# Patient Record
Sex: MALE | Race: WHITE | Employment: OTHER | URBAN - METROPOLITAN AREA
[De-identification: names, ages, dates, MRNs, and addresses within clinical notes are randomized per-mention and may not be internally consistent; named-entity substitution may affect disease eponyms.]

---

## 2024-01-03 ENCOUNTER — APPOINTMENT (OUTPATIENT)
Dept: CT IMAGING | Facility: HOSPITAL | Age: 89
End: 2024-01-03
Attending: EMERGENCY MEDICINE
Payer: MEDICARE

## 2024-01-03 ENCOUNTER — HOSPITAL ENCOUNTER (INPATIENT)
Facility: HOSPITAL | Age: 89
LOS: 3 days | Discharge: NURSING, REHAB OR LONG-TERM CARE | End: 2024-01-07
Attending: EMERGENCY MEDICINE | Admitting: HOSPITALIST
Payer: MEDICARE

## 2024-01-03 ENCOUNTER — APPOINTMENT (OUTPATIENT)
Dept: GENERAL RADIOLOGY | Facility: HOSPITAL | Age: 89
End: 2024-01-03
Attending: EMERGENCY MEDICINE
Payer: MEDICARE

## 2024-01-03 DIAGNOSIS — R79.89 ELEVATED TROPONIN: ICD-10-CM

## 2024-01-03 DIAGNOSIS — R07.9 CHEST PAIN OF UNCERTAIN ETIOLOGY: ICD-10-CM

## 2024-01-03 LAB
ALBUMIN SERPL-MCNC: 3.4 G/DL (ref 3.4–5)
ALBUMIN/GLOB SERPL: 0.8 {RATIO} (ref 1–2)
ALP LIVER SERPL-CCNC: 101 U/L
ALT SERPL-CCNC: 18 U/L
ANION GAP SERPL CALC-SCNC: 6 MMOL/L (ref 0–18)
AST SERPL-CCNC: 19 U/L (ref 15–37)
BASOPHILS # BLD AUTO: 0.03 X10(3) UL (ref 0–0.2)
BASOPHILS NFR BLD AUTO: 0.2 %
BILIRUB SERPL-MCNC: 0.4 MG/DL (ref 0.1–2)
BUN BLD-MCNC: 25 MG/DL (ref 9–23)
CALCIUM BLD-MCNC: 8.7 MG/DL (ref 8.5–10.1)
CHLORIDE SERPL-SCNC: 106 MMOL/L (ref 98–112)
CHOLEST SERPL-MCNC: 150 MG/DL (ref ?–200)
CO2 SERPL-SCNC: 29 MMOL/L (ref 21–32)
CREAT BLD-MCNC: 1.69 MG/DL
D DIMER PPP FEU-MCNC: 1.94 UG/ML FEU (ref ?–0.86)
EGFRCR SERPLBLD CKD-EPI 2021: 39 ML/MIN/1.73M2 (ref 60–?)
EOSINOPHIL # BLD AUTO: 0.47 X10(3) UL (ref 0–0.7)
EOSINOPHIL NFR BLD AUTO: 3.1 %
ERYTHROCYTE [DISTWIDTH] IN BLOOD BY AUTOMATED COUNT: 14.8 %
GLOBULIN PLAS-MCNC: 4.4 G/DL (ref 2.8–4.4)
GLUCOSE BLD-MCNC: 121 MG/DL (ref 70–99)
HCT VFR BLD AUTO: 48 %
HDLC SERPL-MCNC: 55 MG/DL (ref 40–59)
HGB BLD-MCNC: 15.2 G/DL
IMM GRANULOCYTES # BLD AUTO: 0.1 X10(3) UL (ref 0–1)
IMM GRANULOCYTES NFR BLD: 0.7 %
LDLC SERPL CALC-MCNC: 77 MG/DL (ref ?–100)
LYMPHOCYTES # BLD AUTO: 0.8 X10(3) UL (ref 1–4)
LYMPHOCYTES NFR BLD AUTO: 5.3 %
MCH RBC QN AUTO: 31.6 PG (ref 26–34)
MCHC RBC AUTO-ENTMCNC: 31.7 G/DL (ref 31–37)
MCV RBC AUTO: 99.8 FL
MONOCYTES # BLD AUTO: 0.82 X10(3) UL (ref 0.1–1)
MONOCYTES NFR BLD AUTO: 5.4 %
NEUTROPHILS # BLD AUTO: 12.85 X10 (3) UL (ref 1.5–7.7)
NEUTROPHILS # BLD AUTO: 12.85 X10(3) UL (ref 1.5–7.7)
NEUTROPHILS NFR BLD AUTO: 85.3 %
NONHDLC SERPL-MCNC: 95 MG/DL (ref ?–130)
OSMOLALITY SERPL CALC.SUM OF ELEC: 298 MOSM/KG (ref 275–295)
PLATELET # BLD AUTO: 251 10(3)UL (ref 150–450)
POTASSIUM SERPL-SCNC: 3.8 MMOL/L (ref 3.5–5.1)
PROT SERPL-MCNC: 7.8 G/DL (ref 6.4–8.2)
RBC # BLD AUTO: 4.81 X10(6)UL
SODIUM SERPL-SCNC: 141 MMOL/L (ref 136–145)
TRIGL SERPL-MCNC: 95 MG/DL (ref 30–149)
TROPONIN I SERPL HS-MCNC: 85 NG/L
VLDLC SERPL CALC-MCNC: 15 MG/DL (ref 0–30)
WBC # BLD AUTO: 15.1 X10(3) UL (ref 4–11)

## 2024-01-03 PROCEDURE — 71045 X-RAY EXAM CHEST 1 VIEW: CPT | Performed by: EMERGENCY MEDICINE

## 2024-01-03 PROCEDURE — 74177 CT ABD & PELVIS W/CONTRAST: CPT | Performed by: EMERGENCY MEDICINE

## 2024-01-03 PROCEDURE — 71275 CT ANGIOGRAPHY CHEST: CPT | Performed by: EMERGENCY MEDICINE

## 2024-01-03 RX ORDER — MORPHINE SULFATE 4 MG/ML
4 INJECTION, SOLUTION INTRAMUSCULAR; INTRAVENOUS EVERY 30 MIN PRN
Status: DISCONTINUED | OUTPATIENT
Start: 2024-01-03 | End: 2024-01-07

## 2024-01-03 RX ORDER — ONDANSETRON 2 MG/ML
4 INJECTION INTRAMUSCULAR; INTRAVENOUS ONCE
Status: COMPLETED | OUTPATIENT
Start: 2024-01-03 | End: 2024-01-03

## 2024-01-04 ENCOUNTER — APPOINTMENT (OUTPATIENT)
Dept: CV DIAGNOSTICS | Facility: HOSPITAL | Age: 89
End: 2024-01-04
Attending: NURSE PRACTITIONER
Payer: MEDICARE

## 2024-01-04 PROBLEM — R79.89 ELEVATED TROPONIN: Status: ACTIVE | Noted: 2024-01-04

## 2024-01-04 PROBLEM — I10 PRIMARY HYPERTENSION: Status: ACTIVE | Noted: 2024-01-04

## 2024-01-04 PROBLEM — N30.00 ACUTE CYSTITIS WITHOUT HEMATURIA: Status: ACTIVE | Noted: 2024-01-04

## 2024-01-04 LAB
ATRIAL RATE: 124 BPM
BASOPHILS # BLD AUTO: 0.02 X10(3) UL (ref 0–0.2)
BASOPHILS # BLD AUTO: 0.03 X10(3) UL (ref 0–0.2)
BASOPHILS NFR BLD AUTO: 0.2 %
BASOPHILS NFR BLD AUTO: 0.2 %
BILIRUB UR QL STRIP.AUTO: NEGATIVE
CLARITY UR REFRACT.AUTO: CLEAR
COLOR UR AUTO: YELLOW
EOSINOPHIL # BLD AUTO: 0.03 X10(3) UL (ref 0–0.7)
EOSINOPHIL # BLD AUTO: 0.12 X10(3) UL (ref 0–0.7)
EOSINOPHIL NFR BLD AUTO: 0.2 %
EOSINOPHIL NFR BLD AUTO: 1.3 %
ERYTHROCYTE [DISTWIDTH] IN BLOOD BY AUTOMATED COUNT: 15 %
ERYTHROCYTE [DISTWIDTH] IN BLOOD BY AUTOMATED COUNT: 15 %
GLUCOSE UR STRIP.AUTO-MCNC: NORMAL MG/DL
HCT VFR BLD AUTO: 39.7 %
HCT VFR BLD AUTO: 46.3 %
HGB BLD-MCNC: 12.7 G/DL
HGB BLD-MCNC: 14.4 G/DL
IMM GRANULOCYTES # BLD AUTO: 0.06 X10(3) UL (ref 0–1)
IMM GRANULOCYTES # BLD AUTO: 0.07 X10(3) UL (ref 0–1)
IMM GRANULOCYTES NFR BLD: 0.5 %
IMM GRANULOCYTES NFR BLD: 0.6 %
KETONES UR STRIP.AUTO-MCNC: NEGATIVE MG/DL
LACTATE SERPL-SCNC: 1.9 MMOL/L (ref 0.4–2)
LACTATE SERPL-SCNC: 2.2 MMOL/L (ref 0.4–2)
LACTATE SERPL-SCNC: 2.7 MMOL/L (ref 0.4–2)
LACTATE SERPL-SCNC: 3.1 MMOL/L (ref 0.4–2)
LEUKOCYTE ESTERASE UR QL STRIP.AUTO: 75
LYMPHOCYTES # BLD AUTO: 0.41 X10(3) UL (ref 1–4)
LYMPHOCYTES # BLD AUTO: 1.15 X10(3) UL (ref 1–4)
LYMPHOCYTES NFR BLD AUTO: 12.1 %
LYMPHOCYTES NFR BLD AUTO: 2.6 %
MCH RBC QN AUTO: 31.6 PG (ref 26–34)
MCH RBC QN AUTO: 31.7 PG (ref 26–34)
MCHC RBC AUTO-ENTMCNC: 31.1 G/DL (ref 31–37)
MCHC RBC AUTO-ENTMCNC: 32 G/DL (ref 31–37)
MCV RBC AUTO: 101.5 FL
MCV RBC AUTO: 99 FL
MONOCYTES # BLD AUTO: 0.76 X10(3) UL (ref 0.1–1)
MONOCYTES # BLD AUTO: 1.12 X10(3) UL (ref 0.1–1)
MONOCYTES NFR BLD AUTO: 11.8 %
MONOCYTES NFR BLD AUTO: 4.9 %
NEUTROPHILS # BLD AUTO: 14.21 X10 (3) UL (ref 1.5–7.7)
NEUTROPHILS # BLD AUTO: 14.21 X10(3) UL (ref 1.5–7.7)
NEUTROPHILS # BLD AUTO: 7.06 X10 (3) UL (ref 1.5–7.7)
NEUTROPHILS # BLD AUTO: 7.06 X10(3) UL (ref 1.5–7.7)
NEUTROPHILS NFR BLD AUTO: 74 %
NEUTROPHILS NFR BLD AUTO: 91.6 %
P AXIS: -24 DEGREES
P-R INTERVAL: 166 MS
PH UR STRIP.AUTO: 5.5 [PH] (ref 5–8)
PLATELET # BLD AUTO: 194 10(3)UL (ref 150–450)
PLATELET # BLD AUTO: 196 10(3)UL (ref 150–450)
PROT UR STRIP.AUTO-MCNC: 20 MG/DL
Q-T INTERVAL: 294 MS
Q-T INTERVAL: 300 MS
QRS DURATION: 104 MS
QRS DURATION: 98 MS
QTC CALCULATION (BEZET): 422 MS
QTC CALCULATION (BEZET): 469 MS
R AXIS: -42 DEGREES
R AXIS: -44 DEGREES
RBC # BLD AUTO: 4.01 X10(6)UL
RBC # BLD AUTO: 4.56 X10(6)UL
RBC UR QL AUTO: NEGATIVE
SARS-COV-2 RNA RESP QL NAA+PROBE: NOT DETECTED
SP GR UR STRIP.AUTO: 1.02 (ref 1–1.03)
T AXIS: 113 DEGREES
T AXIS: 83 DEGREES
TROPONIN I SERPL HS-MCNC: 111 NG/L
TROPONIN I SERPL HS-MCNC: 215 NG/L
TROPONIN I SERPL HS-MCNC: 273 NG/L
TSI SER-ACNC: 3.72 MIU/ML (ref 0.36–3.74)
UROBILINOGEN UR STRIP.AUTO-MCNC: NORMAL MG/DL
VENTRICULAR RATE: 124 BPM
VENTRICULAR RATE: 147 BPM
WBC # BLD AUTO: 15.5 X10(3) UL (ref 4–11)
WBC # BLD AUTO: 9.5 X10(3) UL (ref 4–11)

## 2024-01-04 PROCEDURE — 93306 TTE W/DOPPLER COMPLETE: CPT | Performed by: NURSE PRACTITIONER

## 2024-01-04 PROCEDURE — 99223 1ST HOSP IP/OBS HIGH 75: CPT | Performed by: HOSPITALIST

## 2024-01-04 RX ORDER — ASPIRIN 325 MG
325 TABLET ORAL DAILY
Status: DISCONTINUED | OUTPATIENT
Start: 2024-01-05 | End: 2024-01-05

## 2024-01-04 RX ORDER — ACETAMINOPHEN 500 MG
1000 TABLET ORAL EVERY 4 HOURS PRN
Status: DISCONTINUED | OUTPATIENT
Start: 2024-01-04 | End: 2024-01-07

## 2024-01-04 RX ORDER — SENNOSIDES 8.6 MG
17.2 TABLET ORAL NIGHTLY PRN
Status: DISCONTINUED | OUTPATIENT
Start: 2024-01-04 | End: 2024-01-07

## 2024-01-04 RX ORDER — GARLIC EXTRACT 500 MG
1 CAPSULE ORAL 2 TIMES DAILY
COMMUNITY

## 2024-01-04 RX ORDER — CLOPIDOGREL BISULFATE 75 MG/1
75 TABLET ORAL DAILY
COMMUNITY
Start: 2024-01-03 | End: 2024-01-07

## 2024-01-04 RX ORDER — POLYETHYLENE GLYCOL 3350 17 G/17G
17 POWDER, FOR SOLUTION ORAL DAILY PRN
Status: DISCONTINUED | OUTPATIENT
Start: 2024-01-04 | End: 2024-01-07

## 2024-01-04 RX ORDER — ADENOSINE 3 MG/ML
INJECTION, SOLUTION INTRAVENOUS
Status: COMPLETED
Start: 2024-01-04 | End: 2024-01-04

## 2024-01-04 RX ORDER — TRAZODONE HYDROCHLORIDE 100 MG/1
100 TABLET ORAL NIGHTLY
COMMUNITY
Start: 2023-12-17

## 2024-01-04 RX ORDER — BISACODYL 10 MG
10 SUPPOSITORY, RECTAL RECTAL
Status: DISCONTINUED | OUTPATIENT
Start: 2024-01-04 | End: 2024-01-07

## 2024-01-04 RX ORDER — CHOLESTYRAMINE 4 G/9G
4 POWDER, FOR SUSPENSION ORAL 2 TIMES DAILY PRN
COMMUNITY
End: 2024-01-07

## 2024-01-04 RX ORDER — ADENOSINE 3 MG/ML
12 INJECTION, SOLUTION INTRAVENOUS ONCE
Status: COMPLETED | OUTPATIENT
Start: 2024-01-04 | End: 2024-01-04

## 2024-01-04 RX ORDER — MELATONIN
3 NIGHTLY PRN
Status: DISCONTINUED | OUTPATIENT
Start: 2024-01-04 | End: 2024-01-07

## 2024-01-04 RX ORDER — BISACODYL 10 MG
10 SUPPOSITORY, RECTAL RECTAL DAILY
COMMUNITY

## 2024-01-04 RX ORDER — ENOXAPARIN SODIUM 100 MG/ML
40 INJECTION SUBCUTANEOUS DAILY
Status: DISCONTINUED | OUTPATIENT
Start: 2024-01-04 | End: 2024-01-07

## 2024-01-04 RX ORDER — GABAPENTIN 300 MG/1
300 CAPSULE ORAL 3 TIMES DAILY
COMMUNITY
Start: 2023-12-11

## 2024-01-04 RX ORDER — MAGNESIUM HYDROXIDE/ALUMINUM HYDROXICE/SIMETHICONE 120; 1200; 1200 MG/30ML; MG/30ML; MG/30ML
30 SUSPENSION ORAL 4 TIMES DAILY PRN
Status: DISCONTINUED | OUTPATIENT
Start: 2024-01-04 | End: 2024-01-07

## 2024-01-04 RX ORDER — ENEMA 19; 7 G/133ML; G/133ML
1 ENEMA RECTAL ONCE AS NEEDED
Status: DISCONTINUED | OUTPATIENT
Start: 2024-01-04 | End: 2024-01-07

## 2024-01-04 RX ORDER — PANTOPRAZOLE SODIUM 20 MG/1
20 TABLET, DELAYED RELEASE ORAL
COMMUNITY
Start: 2023-12-16

## 2024-01-04 RX ORDER — POLYETHYLENE GLYCOL 3350 17 G/17G
17 POWDER, FOR SOLUTION ORAL 2 TIMES DAILY PRN
COMMUNITY

## 2024-01-04 RX ORDER — VANCOMYCIN HYDROCHLORIDE 125 MG/1
125 CAPSULE ORAL DAILY
Status: DISCONTINUED | OUTPATIENT
Start: 2024-01-04 | End: 2024-01-07

## 2024-01-04 RX ORDER — ACETAMINOPHEN 325 MG/1
325 TABLET ORAL EVERY 4 HOURS PRN
COMMUNITY

## 2024-01-04 RX ORDER — CALCIUM CARBONATE 500 MG/1
1000 TABLET, CHEWABLE ORAL 3 TIMES DAILY PRN
Status: DISCONTINUED | OUTPATIENT
Start: 2024-01-04 | End: 2024-01-07

## 2024-01-04 RX ORDER — ONDANSETRON 2 MG/ML
4 INJECTION INTRAMUSCULAR; INTRAVENOUS EVERY 6 HOURS PRN
Status: DISCONTINUED | OUTPATIENT
Start: 2024-01-04 | End: 2024-01-07

## 2024-01-04 RX ORDER — METOCLOPRAMIDE HYDROCHLORIDE 5 MG/ML
5 INJECTION INTRAMUSCULAR; INTRAVENOUS EVERY 8 HOURS PRN
Status: DISCONTINUED | OUTPATIENT
Start: 2024-01-04 | End: 2024-01-07

## 2024-01-04 RX ORDER — BENZONATATE 100 MG/1
200 CAPSULE ORAL 3 TIMES DAILY PRN
Status: DISCONTINUED | OUTPATIENT
Start: 2024-01-04 | End: 2024-01-07

## 2024-01-04 RX ORDER — PANTOPRAZOLE SODIUM 20 MG/1
20 TABLET, DELAYED RELEASE ORAL
Status: DISCONTINUED | OUTPATIENT
Start: 2024-01-04 | End: 2024-01-04

## 2024-01-04 RX ORDER — SODIUM CHLORIDE 9 MG/ML
INJECTION, SOLUTION INTRAVENOUS CONTINUOUS
Status: DISCONTINUED | OUTPATIENT
Start: 2024-01-04 | End: 2024-01-07

## 2024-01-04 RX ORDER — GUAIFENESIN AND DEXTROMETHORPHAN HYDROBROMIDE 100; 10 MG/5ML; MG/5ML
5 SOLUTION ORAL EVERY 4 HOURS PRN
COMMUNITY

## 2024-01-04 RX ORDER — ADENOSINE 3 MG/ML
6 INJECTION, SOLUTION INTRAVENOUS ONCE
Status: COMPLETED | OUTPATIENT
Start: 2024-01-04 | End: 2024-01-04

## 2024-01-04 RX ORDER — NITROGLYCERIN 0.4 MG/1
0.4 TABLET SUBLINGUAL EVERY 5 MIN PRN
Status: DISCONTINUED | OUTPATIENT
Start: 2024-01-04 | End: 2024-01-07

## 2024-01-04 NOTE — CM/SW NOTE
Pt admitted to [facility] from  [place].  Updates sent.    Elizabeth Cunningham, TERRYW  /Discharge Planner

## 2024-01-04 NOTE — OCCUPATIONAL THERAPY NOTE
OT order received and pt chart reviewed. Per discussion with residence staff, pt is total assist with all ADLs at baseline and requires hiram lift for transfers to chair. Pt requires assist to mobilize in wheel chair, and uses adaptive utensils to feed self. Pt is LTC at [facility]. Pt is at or close to baseline and has required level of assist at LTC facility. Pt not appropriate for IP OT at this time. OT will sign off.

## 2024-01-04 NOTE — PLAN OF CARE
Germain Ernandez Patient Status:  Emergency    1935 MRN [id number]   Location [facility] EMERGENCY DEPARTMENT Attending Henry Mc MD   Hosp Day # 0 PCP KENIA MONTES [facility]     Cardiology Nocturnal APN Note    Page Received: ED MD 5916    Briefly: (Documentation from chart review)     Germain Ernandez is a 87 yo M with PMH/PSH of HTN, PAD s/p stenting, pneumothorax, CKD, anemia, cervical stenosis presents to the ED from [place] c/o substernal CP associated with n/v.  In the ED, EKG with SVT , CTA chest negative for PE, CXR without acute process, troponin 85, Cr 1.69, lactic 2.2, WBC 15.1.  Covid negative.  Cardiology consulted for CP, elevated troponin.    In the ED, patient received adenosine x 2     Primary Cardiologist No cardiologist on record    Vital Signs:       2024     1:00 AM 2024     1:08 AM   Vitals History   /81    Pulse 152    Resp 20    Temp  99.5 °F (37.5 °C)   SpO2 96 %         Labs:   Lab Results   Component Value Date    WBC 15.1 2024    HGB 15.2 2024    HCT 48.0 2024    .0 2024    CREATSERUM 1.69 2024    BUN 25 2024     2024    K 3.8 2024     2024    CO2 29.0 2024     2024    CA 8.7 2024    ALB 3.4 2024    ALKPHO 101 2024    BILT 0.4 2024    TP 7.8 2024    AST 19 2024    ALT 18 2024    TSH 3.720 2024    DDIMER 1.94 2024    TROPHS 85 2024       Diagnostics:   XR CHEST AP PORTABLE  (CPT=71045)    Result Date: 1/3/2024  CONCLUSION:  No evidence of active cardiopulmonary disease.   LOCATION:  [place]      Dictated by (CST): Evaristo Coelho MD on 2024 at 11:23 PM     Finalized by (CST): Evaristo Coelho MD on 2024 at 11:24 PM          OS peripheral angio 2023     IMPRESSION/RECOMMENDATIONS:   1.   Successful atherectomy and balloon angioplasty with a 6 mm drug-coated balloon of an 80% lesion in the  popliteal artery on the left with excellent results.   2.    Successful atherectomy  and balloon angioplasty with of the AT with a 3.0 x 40 Chocolate balloon with excellent results.   3.    Successful balloon angioplasty of 100%  of the dorsalis pedis with a 2.5 x 40 Chocolate balloon with acceptable results.   4.    Persistent  of the posterior tibial artery on the left, left for medical management.  Consideration for intervention should he fail conservative management.   5.    The patient will be given aspirin, Plavix, admitted overnight.  I will discharge back to nursing home tomorrow morning.     Allergies:  Not on File    Medications:    morphINE    Assessment:   #SVT likely compensatory for acute infectious process  #Elevated troponin, may be demand  #Elevated lactic acid  #Leukocytosis   #PAD s/p cath and stenting 6/2023       Plan:  - Trend troponin  - Echo in AM   - Continue to monitor overnight  - Formal Cardiology consult to follow in AM.       FLAKITO Hendrix  [facility]  1/4/2024  2:32 AM

## 2024-01-04 NOTE — ED QUICK NOTES
Orders for admission, patient is aware of plan and ready to go upstairs. Any questions, please call ED RN Sim at extension [phone].     Patient Covid vaccination status: Unvaccinated     COVID Test Ordered in ED: Rapid SARS-CoV-2 by PCR    COVID Suspicion at Admission: N/A    Running Infusions:      Mental Status/LOC at time of transport: a/0/4    Other pertinent information:   CIWA score: N/A   NIH score:  N/A

## 2024-01-04 NOTE — ED INITIAL ASSESSMENT (HPI)
Patient started having substernal chest pain at 2100 today. Patient is nauseous actively vomiting, chest pain is a 4/10 in the middle No SOB or blurred vision at this time. Baby aspirin given and nitro were given

## 2024-01-04 NOTE — PROGRESS NOTES
[facility]     Hospitalist Progress Note     Germainji Ernandez Patient Status:  Inpatient    1935 MRN [id number]   Location [facility] 2NE-A Attending Shani Nelson MD   Hosp Day # 0 PCP KENIA FLOR     Chief Complaint: N, V     Subjective:     Patient  having acid reflux, heartburn     Objective:    Review of Systems:   A comprehensive review of systems was completed; pertinent positive and negatives stated in subjective.    Vital signs:  Temp:  [98.1 °F (36.7 °C)-99.5 °F (37.5 °C)] 98.1 °F (36.7 °C)  Pulse:  [] 89  Resp:  [15-24] 19  BP: ()/(74-93) 127/83  SpO2:  [89 %-99 %] 99 %    Physical Exam:    General: No acute distress  Respiratory: no wheezes, no rhonchi  Cardiovascular: S1, S2, regular rate and rhythm  Abdomen: Soft, Non-tender, non-distended, positive bowel sounds  Neuro: No new focal deficits.   Extremities: no edema      Diagnostic Data:    Labs:  Recent Labs   Lab 24  2244 24  0356   WBC 15.1* 15.5*   HGB 15.2 14.4   MCV 99.8 101.5*   .0 196.0       Recent Labs   Lab 24  2239   *   BUN 25*   CREATSERUM 1.69*   CA 8.7   ALB 3.4      K 3.8      CO2 29.0   ALKPHO 101   AST 19   ALT 18   BILT 0.4   TP 7.8       Estimated Creatinine Clearance: 35.5 mL/min (A) (based on SCr of 1.69 mg/dL (H)).    Recent Labs   Lab 24  0356 24  0801 24  1240   TROPHS 111* 215* 273*       No results for input(s): \"PTP\", \"INR\" in the last 168 hours.    Lab Results   Component Value Date    TSH 3.720 2024             Microbiology    No results found for this visit on 24.      Imaging: Reviewed in Epic.    Medications:    [START ON 2024] aspirin  325 mg Oral Daily    cefTRIAXone  2 g Intravenous Q24H    enoxaparin  40 mg Subcutaneous Daily    vancomycin  125 mg Oral Daily       Assessment & Plan:      #chest pain  Troponin uptrending   Cards on CS- pt does not want invasive procedure  ASA     #SVT/sinus  tachycardia  -s/p adenosine in ED, c/w sinus tach  -IVF  -likely related to infection as below     #n/v -resolved   -occurred after the pain started  -zofran     #low grade fever, leukocytosis  #suspected UTI  IV ceftriaxone, monitor cs    #h/o C Diff requiring fecal transplant  Po vanco ppx    #HTN  #HLD  #CKD    #GOC  Confirmed he is DNAR/C with Eliane Novoa MD    Supplementary Documentation:     Quality:  DVT Mechanical Prophylaxis:   SCDs,    DVT Pharmacologic Prophylaxis   Medication    enoxaparin (Lovenox) 40 MG/0.4ML SUBQ injection 40 mg    DVT Pharmacologic prophylaxis: Aspirin 325 mg           Code Status: DNAR/Comfort Care  [place]: No urinary catheter in place  [place] Duration (in days):   Central line:    RACHEL:     Discharge is dependent on: clinical  At this point Mr. Ernandez is expected to be discharge to: home    The 21st Century Cures Act makes medical notes like these available to patients in the interest of transparency. Please be advised this is a medical document. Medical documents are intended to carry relevant information, facts as evident, and the clinical opinion of the practitioner. The medical note is intended as peer to peer communication and may appear blunt or direct. It is written in medical language and may contain abbreviations or verbiage that are unfamiliar.             **Certification      PHYSICIAN Certification of Need for Inpatient Hospitalization - Initial Certification    Patient will require inpatient services that will reasonably be expected to span two midnight's based on the clinical documentation in H+P.   Based on patients current state of illness, I anticipate that, after discharge, patient will require TBD.

## 2024-01-04 NOTE — CM/SW NOTE
Received a call from Sim Smith requesting assistance on having the POLST form completed/signed by the pt's son. EDCM spoke to the pt's son/Soto and explained the polst form. Soto/son completed and signed the Polst form.   Polst form scanned for the pt chart and form is placed in the pt's chart.

## 2024-01-04 NOTE — CONSULTS
Cardiology Consult Note    Germain Ernandez Patient Status:  Inpatient    1935 MRN [id number]   Location [facility] 2NE-A Attending Shani Nelson MD   Hosp Day # 0 PCP KENIA MIRANDAEDDIE     89 yo male presents with chest pain described as severe chest burning reminiscent of his reflux however had significant vomiting which was different than his normal reflux, elevated troponin therefore cardiology consulted.     Now feels back to his normal self.  Has a history of neck surgery resulting in paralysis, full assist and lives in assisted living.    Denies chest pain, shortness of breath, lower extremity edema, orthopnea, PND palpitations or syncope.      ED work up  Lactic acid rising 3.1  Trop rising 215, EKG SVT, inferior Q waves are chronic however no old EKGs  WBC 15.5, macrocytosis      Assessment:    NSTEMI: Likely type II from infection with elevated lactic acid and dirty UA.  However echocardiogram with inferior wall hypokinesis with old inferior Q waves on EKG.  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  Said he would not want invasive procedures unless absolutely necessary.  SVT: Likely due to infection giving low-grade fever, leukocytosis, likely UTI  Possible UTI  Hypertension, hyperlipidemia, chronic kidney disease      Plan:  Trend troponin until downtrending  IV fluids  Will treat cardiac status conservatively      Level of care: C5    Russell Houser MD  Interventional Cardiology  [facility]    --------------------------------------------------------------------------------------------------------------------------------  ROS 10 systems reviewed, pertinent findings above.  ROS    History:  Past Medical History:   Diagnosis Date    Chronic kidney disease     Esophageal reflux     Essential hypertension     High blood pressure     High cholesterol     Hyperlipidemia     Peripheral vascular disease (HCC)      Renal disorder      History reviewed. No pertinent surgical history.  History reviewed. No pertinent family history.   reports that he quit smoking about 48 years ago. His smoking use included cigarettes and cigars. He has never used smokeless tobacco. He reports current alcohol use. He reports that he does not use drugs.    Objective:   Temp: 98.5 °F (36.9 °C)  Pulse: 94  Resp: 19  BP: 110/76    Intake/Output:   No intake or output data in the 24 hours ending 01/04/24 0857    Physical Exam:     General: NAD  HEENT: Normocephalic, anicteric sclera, neck supple.  Neck: No JVD, carotids 2+, no bruits.  Cardiac: Regular rate and rhythm. S1, S2 normal. No murmur, pericardial rub, S3.  Lungs: Clear without wheezes, rales, rhonchi or dullness.  Normal excursions and effort.  Abdomen: Soft, non-tender. BS-present.  Extremities: Without clubbing, cyanosis or edema.  Peripheral pulses are 2+.  Neurologic: Non-focal  Skin: Warm and dry.       Russell Houser MD  1/4/2024  8:57 AM

## 2024-01-04 NOTE — PROGRESS NOTES
[facility] Note: Antimicrobial Weight Based Dose Adjustment for: ceftriaxone (ROCEPHIN)    Germain Ernandez is a 88 year old patient who has been prescribed ceftriaxone (ROCEPHIN) 1000 mg every 24 hours.    Estimated Creatinine Clearance: 35.5 mL/min (A) (based on SCr of 1.69 mg/dL (H)).  Wt Readings from Last 6 Encounters:   01/03/24 107 kg (236 lb)     Body mass index is 31.14 kg/m².    Based on this criteria and renal function, dose will be adjusted to ceftriaxone (ROCEPHIN) 2000 mg every 24 hours.    Thank you,    Bernardino Mancuso, [facility]  1/4/2024  3:49 AM

## 2024-01-04 NOTE — H&P
[facility]     Germain Ernandez Patient Status:  Emergency    1935 MRN [id number]   Location [facility] EMERGENCY DEPARTMENT Attending Henry Mc MD   Hosp Day # 0 PCP KENIA FLOR     Chief Complaint: chest pan     Subjective:    History of Present Illness:     Germain Ernandez is a 88 year old male with PMH CKD< Htn, HLD who p/w CP. Started at 9 PM while sitting in bed. Denies SOB, cough, radiation. Abd pain. The pain was severe and eventually started to vomit. Never had before.     History/Other:    Past Medical History:  Past Medical History:   Diagnosis Date    Chronic kidney disease     Essential hypertension     Hyperlipidemia      Past Surgical History:   History reviewed. No pertinent surgical history.   Family History:   No family history on file.  Social History:         Allergies: Not on File    Medications:    No current facility-administered medications on file prior to encounter.     No current outpatient medications on file prior to encounter.       Review of Systems:   A comprehensive review of systems was completed.    Pertinent positives and negatives noted in the HPI.    Objective:   Physical Exam:    /88   Pulse 107   Temp 99.5 °F (37.5 °C) (Temporal)   Resp 15   Ht 6' 1\" (1.854 m)   Wt 236 lb (107 kg)   SpO2 97%   BMI 31.14 kg/m²   General: No acute distress, Alert  Respiratory: No rhonchi, no wheezes  Cardiovascular: S1, S2. Regular rate and rhythm, tachycardic  Abdomen: Soft, Non-tender, non-distended, positive bowel sounds  Neuro: No new focal deficits  Extremities: No edema      Results:    Labs:      Labs Last 24 Hours:    Recent Labs   Lab 24  2244   RBC 4.81   HGB 15.2   HCT 48.0   MCV 99.8   MCH 31.6   MCHC 31.7   RDW 14.8   NEPRELIM 12.85*   WBC 15.1*   .0       Recent Labs   Lab 24  2239   *   BUN 25*   CREATSERUM 1.69*   EGFRCR 39*   CA 8.7   ALB 3.4      K 3.8      CO2 29.0    ALKPHO 101   AST 19   ALT 18   BILT 0.4   TP 7.8       No results found for: \"PT\", \"INR\"    Recent Labs   Lab 01/03/24  2239   TROPHS 85*       No results for input(s): \"TROP\", \"PBNP\" in the last 168 hours.    No results for input(s): \"PCT\" in the last 168 hours.    Imaging: Imaging data reviewed in Epic.    Assessment & Plan:      #chest pain  #mildly elevated trop, likely demand ischemia  -cards  -trops  -asa    #SVT/sinus tachycardia  -s/p adenosine in ED, c/w sinus tach  -IVF  -likely related to infection as below    #n/v  -occurred after the pain started  -zofran    #low grade fever, leukocytosis  #suspected UTI  -iv abx  -cxs  -trend lactic  -IVF    #HTN  #HLD  #CKD      Plan of care discussed with pt, ed jellycigeoff Smith MD    Supplementary Documentation:     The 21st Century Cures Act makes medical notes like these available to patients in the interest of transparency. Please be advised this is a medical document. Medical documents are intended to carry relevant information, facts as evident, and the clinical opinion of the practitioner. The medical note is intended as peer to peer communication and may appear blunt or direct. It is written in medical language and may contain abbreviations or verbiage that are unfamiliar.

## 2024-01-04 NOTE — ED QUICK NOTES
This RN called [facility] and spoke with Fransisca SALGADO. Fransisca reports pt family filed a DNR with Patrizia Magana NP at the facility. But reports the DNR papers have not been officially filed in the pt's chart and to their knowledge, the pt remains a full code at this time.  at bedside with primary RN and pt son.

## 2024-01-04 NOTE — ED PROVIDER NOTES
Patient Seen in: [facility] Emergency Department      History     Chief Complaint   Patient presents with    Chest Pain Angina     Stated Complaint: cp    Subjective:   HPI    Patient is 88-year-old male presents emergency room for evaluation of chest pain.  Patient denies history of coronary disease.  Patient started with sudden onset of chest pressure around 9 PM while sitting in bed.  Patient denies shortness of breath.  Denies radiation of pain.  No abdominal pain.  4 episodes of vomiting.  Patient states pain feels like a pressure on his chest.  Patient denies history of coronary disease.  No history of PE or DVT.  Cardiac risk factors include hypertension, hyperlipidemia.    Objective:   Past Medical History:   Diagnosis Date    Chronic kidney disease     Essential hypertension     Hyperlipidemia               History reviewed. No pertinent surgical history.             Social History     Socioeconomic History    Marital status:               Review of Systems    Positive for stated complaint: cp  Other systems are as noted in HPI.  Constitutional and vital signs reviewed.      All other systems reviewed and negative except as noted above.    Physical Exam     ED Triage Vitals [01/03/24 2239]   BP (!) 126/93   Pulse (!) 126   Resp 24   Temp 98.3 °F (36.8 °C)   Temp src Oral   SpO2 95 %   O2 Device None (Room air)       Current:/88   Pulse 107   Temp 99.5 °F (37.5 °C) (Temporal)   Resp 15   Ht 185.4 cm (6' 1\")   Wt 107 kg   SpO2 97%   BMI 31.14 kg/m²         Physical Exam    GENERAL: No acute distress, well appearing and non-toxic, Alert and oriented X 3   HEENT: Normocephalic, atraumatic.  Moist mucous membranes.  Pupils equal round reactive to light and accommodation, extraocular motion is intact, sclerae white, conjunctiva is pink.  Oropharynx is unremarkable, no exudate.  NECK: Supple, trachea midline, no lymphadenopathy.   LUNG: Lungs clear to auscultation bilaterally, no  wheezing, no rales, no rhonchi.  CARDIOVASCULAR: Tachycardic regular rate and rhythm.  Normal S1S2.  No S3S4 or murmur.  ABDOMEN: Bowel sounds are present. Soft. nondistended, no pulsatile masses. nontender  MUSCULOSKELETAL: No calf tenderness.  Dorsalis and Posterior Tibial pulses present. No clubbing. No cyanosis.  No edema.   SKIN EXAMINATIoN: Warm and dry with normal appearance.  No rashes or lesions.  NEUROLOGICAL:  Motor strength intact all groups.  normal sensation, speech intact    ED Course     Labs Reviewed   COMP METABOLIC PANEL (14) - Abnormal; Notable for the following components:       Result Value    Glucose 121 (*)     BUN 25 (*)     Creatinine 1.69 (*)     Calculated Osmolality 298 (*)     eGFR-Cr 39 (*)     A/G Ratio 0.8 (*)     All other components within normal limits   TROPONIN I HIGH SENSITIVITY - Abnormal; Notable for the following components:    Troponin I (High Sensitivity) 85 (*)     All other components within normal limits   D-DIMER - Abnormal; Notable for the following components:    D-Dimer 1.94 (*)     All other components within normal limits   LACTIC ACID, PLASMA - Abnormal; Notable for the following components:    Lactic Acid 2.2 (*)     All other components within normal limits   URINALYSIS WITH CULTURE REFLEX - Abnormal; Notable for the following components:    Protein Urine 20 (*)     Nitrite Urine 2+ (*)     Leukocyte Esterase Urine 75 (*)     WBC Urine 11-20 (*)     Bacteria Urine 3+ (*)     Squamous Epi. Cells Few (*)     All other components within normal limits   CBC W/ DIFFERENTIAL - Abnormal; Notable for the following components:    WBC 15.1 (*)     Neutrophil Absolute Prelim 12.85 (*)     Neutrophil Absolute 12.85 (*)     Lymphocyte Absolute 0.80 (*)     All other components within normal limits   LIPID PANEL - Normal   TSH W REFLEX TO FREE T4 - Normal   RAPID SARS-COV-2 BY PCR - Normal   CBC WITH DIFFERENTIAL WITH PLATELET    Narrative:     The following orders were  created for panel order CBC With Differential With Platelet.  Procedure                               Abnormality         Status                     ---------                               -----------         ------                     CBC W/ DIFFERENTIAL[[id number]]          Abnormal            Final result                 Please view results for these tests on the individual orders.   LACTIC ACID REFLEX POST POSTIVE   TROPONIN I HIGH SENSITIVITY   TROPONIN I HIGH SENSITIVITY   CBC WITH DIFFERENTIAL WITH PLATELET    Narrative:     The following orders were created for panel order CBC With Differential With Platelet.  Procedure                               Abnormality         Status                     ---------                               -----------         ------                     CBC W/ DIFFERENTIAL[[id number]]                                                           Please view results for these tests on the individual orders.   RAINBOW DRAW LAVENDER   RAINBOW DRAW LIGHT GREEN   RAINBOW DRAW BLUE   RAINBOW DRAW GOLD   BLOOD CULTURE   BLOOD CULTURE   URINE CULTURE, ROUTINE   CBC W/ DIFFERENTIAL   BUN 25.  Creatinine 1.69.  Troponin 85.  D-dimer 1.94.  Lactic acid 2.2.  Urinalysis is positive for infection.  White blood cell count 15.1  EKG    Rate, intervals and axes as noted on EKG Report.  Rate: 124  Rhythm: Sinus Rhythm  Reading: Sinus tachycardia.  Left axis deviation.  No acute changes         EKG #2 EKG  Rate, Axis and intervals as noted.  I agree with computer interpretation.  Rate: 147  Rhythm: Potential atrial flutter or sinus tachycardia  Reading: No acute changes      I personally reviewed xray films of chest and independent interpretation shows no evidence for pneumonia or pneumothorax.  I also reviewed formal xray report as read by radiology with findings below:    XR CHEST AP PORTABLE  (CPT=71045)    Result Date: 1/3/2024  PROCEDURE:  XR CHEST AP PORTABLE  (CPT=71045)  TECHNIQUE:  AP chest  radiograph was obtained.  COMPARISON:  None.  INDICATIONS:  cp  PATIENT STATED HISTORY: (As transcribed by Technologist)  Patient complains of mid chest/epigastric pain with cough and nausea and vomiting.    FINDINGS:  Normal heart size and pulmonary vascularity.  Mildly tortuous aorta.  No focal pulmonary opacity.            CONCLUSION:  No evidence of active cardiopulmonary disease.   LOCATION:  [facility]      Dictated by (CST): Evaristo Coelho MD on 1/03/2024 at 11:23 PM     Finalized by (CST): Evaristo Coelho MD on 1/03/2024 at 11:24 PM        CT chest, abdomen and pelvis as read by [facility] radiology showed no evidence for PE, dissection.  No pneumonia.  No evidence for acute intra-abdominal abnormality.  Nonobstructive bowel pattern.  Normal appendix.  Fluid is seen throughout the lumen to the rectum suspicious for viral diarrhea.  No colonic wall thickening.  Suspected small amount of gallbladder wall calcification versus gallstones.    Medications   morphINE PF 4 MG/ML injection 4 mg (4 mg Intravenous Given 1/3/24 2303)   aspirin tab 325 mg (has no administration in time range)   nitroglycerin (Nitrostat) SL tab 0.4 mg (has no administration in time range)   cefTRIAXone (Rocephin) 1 g in D5W 100 mL IVPB-ADD (has no administration in time range)   acetaminophen (Tylenol Extra Strength) tab 1,000 mg (has no administration in time range)   melatonin tab 3 mg (has no administration in time range)   polyethylene glycol (PEG 3350) (Miralax) 17 g oral packet 17 g (has no administration in time range)   sennosides (Senokot) tab 17.2 mg (has no administration in time range)   bisacodyl (Dulcolax) 10 MG rectal suppository 10 mg (has no administration in time range)   fleet enema (Fleet) 7-19 GM/118ML rectal enema 133 mL (has no administration in time range)   ondansetron (Zofran) 4 MG/2ML injection 4 mg (has no administration in time range)   metoclopramide (Reglan) 5 mg/mL injection 10 mg (has no administration in time  range)   benzonatate (Tessalon) cap 200 mg (has no administration in time range)   sodium chloride 0.9% infusion (has no administration in time range)   enoxaparin (Lovenox) 40 MG/0.4ML SUBQ injection 40 mg (has no administration in time range)   sodium chloride 0.9 % IV bolus 3,210 mL (has no administration in time range)   ondansetron (Zofran) 4 MG/2ML injection 4 mg (4 mg Intravenous Given 1/3/24 2302)   iopamidol 76% (ISOVUE-370) injection for power injector (100 mL Intravenous Given 1/3/24 2352)   sodium chloride 0.9 % IV bolus 1,000 mL (1,000 mL Intravenous New Bag 1/4/24 0103)   adenosine (Adenocard) 6 mg/2mL injection 6 mg (6 mg Intravenous Given 1/4/24 0050)   adenosine (Adenocard) 6 mg/2mL injection 12 mg (12 mg Intravenous Given 1/4/24 0103)   piperacillin-tazobactam (Zosyn) 4.5 g in dextrose 5% 100 mL IVPB-ADDV (4.5 g Intravenous New Bag 1/4/24 0313)            MDM      Patient is an 88-year-old male presents to emergency room for evaluation of chest pain, vomiting.  Differential includes ACS, aortic dissection, PE, Boerhaave syndrome, sepsis.  Patient had laboratory test performed showing white blood cell count of 15,000 consistent with infectious process.  He had renal insufficiency with creatinine of 1.69.  Slightly elevated troponin at 85.  D-dimer elevated at 1.94.  Lactic acid 2.2.  Given elevated D-dimer, CT of the chest, abdomen pelvis was performed showing no evidence for PE, dissection or acute intra-abdominal or chest abnormality.  Urinalysis obtained showing potential infectious process.  Urine culture was sent.  Blood cultures obtained.  Sepsis bolus 30 cc/kg of normal saline was ordered.  IV Zosyn given to cover for urinary tract infection.  Patient did have increase in heart rate here up to the upper 140s.  He was given adenosine to see if there is any underlying atrial flutter.  He was given 6 mg of adenosine showing slight improvement in tachycardia without signs of underlying atrial  flutter.  12 mg of adenosine were given showing no evidence for underlying arrhythmia.  Tachycardia could be due to sepsis.  Blood cultures obtained.  Case discussed with hospitalist.  Spoke with cardiology as well nurse practitioner for [facility] [place] who wanted to hold off on heparin.  Patient received aspirin prior to arrival.  Cardiology will evaluate in the morning.  We will trend troponins.  He will be placed on cardiac telemetry.  Critical care time was 75 minutes. This critical care time is exclusive of procedures critical care time includes monitoring of patient's cardiopulmonary and hemodynamic status, interpretation of laboratory values, and discussion of case with physician and consultants.  Admission disposition: 1/4/2024  1:42 AM                                     [facility]      Sepsis Reassessment Note    /88   Pulse 107   Temp 99.5 °F (37.5 °C) (Temporal)   Resp 15   Ht 185.4 cm (6' 1\")   Wt 107 kg   SpO2 97%   BMI 31.14 kg/m²      I completed the sepsis reassessment at 2:45 AM    Cardiac:  Regularity: Regular  Rate: Tachycardic  Heart Sounds: No adventitious heart sounds    Lungs:   Right: Clear  Left: Clear    Peripheral Pulses:  Radial: Right 1+ or Left 1+      Capillary Refill:  <3 Secs    Skin:  Temp/Moisture: Warm and Dry  Color: Normal      Henry Mc MD  1/4/2024  3:47 AM      Medical Decision Making      Disposition and Plan     Clinical Impression:  1. Chest pain of uncertain etiology    2. Elevated troponin    Sepsis  Urinary tract infection  Elevated lactic acid    Disposition:  Admit  1/4/2024  1:42 am    Follow-up:  No follow-up provider specified.        Medications Prescribed:  There are no discharge medications for this patient.                        Hospital Problems       Present on Admission             ICD-10-CM Noted POA    * (Principal) Chest pain of uncertain etiology R07.9 1/3/2024 Unknown    Acute cystitis without hematuria  N30.00 1/4/2024 Unknown    Elevated troponin R79.89 1/4/2024 Unknown    Primary hypertension I10 1/4/2024 Unknown

## 2024-01-04 NOTE — PHYSICAL THERAPY NOTE
PT order received for an eval, however OT spoke c RN from [facility] and they stated that the pt is at his baseline for mobility and dependent via hiram lift t/f. He requires total assist for ADLs/care and utilizes adaptive utensils for feeding-however frequently requires assist to feed. Pt does not require any skilled IP PT services at this time as he will return to [facility] and receive restorative therapy services as appropriate. PT will sign off.

## 2024-01-04 NOTE — PLAN OF CARE
NURSING ADMISSION NOTE      Patient admitted via Cart  Oriented to room.  Safety precautions initiated.  Bed in low position.  Call light in reach.    Received pt form ED via cart at 0400. Admission navigators and PTA med list completed. Patient alert and oriented x 4. Maintaining O2 saturation WNL on room air. ST on tele monitor. Complaining of slight soreness to the chest. Decline pain medications at this time. Multiple bruising  noted to the upper extremities. Patient paraplegic, wheelchair bound. Wound to the left 2nd toe and right great toe, dressing in place, clean, dry and intact. Safety precautions in place, call light within reach. Patient aware of plan of care.

## 2024-01-04 NOTE — PROGRESS NOTES
[facility] Note:  Renal Dose Adjustment for Metoclopramide (REGLAN)    Germain Ernandez has been prescribed Metoclopramide (REGLAN) 10 mg every 8 hours as needed for nausea/vomiting,.    Estimated Creatinine Clearance: 35.5 mL/min (A) (based on SCr of 1.69 mg/dL (H)).    Calculated creatinine clearance is < 40 ml/min, therefore, the dose of Metoclopramide (REGLAN) has been changed to 5 mg every 8 hours as needed for nausea/vomiting per P&T approved protocol.  Pharmacy will continue to follow, and if renal function improves, will resume the original order.       Thank you,  Bernardino Mancuso, [facility]  1/4/2024 3:52 AM

## 2024-01-04 NOTE — ED QUICK NOTES
Called and spoke to the nurse Nitin and informed her of the bolus changes the doctor wanted to make

## 2024-01-05 LAB
ANION GAP SERPL CALC-SCNC: 2 MMOL/L (ref 0–18)
BUN BLD-MCNC: 34 MG/DL (ref 9–23)
C DIFF TOX B STL QL: NEGATIVE
CALCIUM BLD-MCNC: 8 MG/DL (ref 8.5–10.1)
CHLORIDE SERPL-SCNC: 115 MMOL/L (ref 98–112)
CO2 SERPL-SCNC: 25 MMOL/L (ref 21–32)
CREAT BLD-MCNC: 1.7 MG/DL
EGFRCR SERPLBLD CKD-EPI 2021: 39 ML/MIN/1.73M2 (ref 60–?)
ERYTHROCYTE [DISTWIDTH] IN BLOOD BY AUTOMATED COUNT: 15.1 %
GLUCOSE BLD-MCNC: 90 MG/DL (ref 70–99)
HCT VFR BLD AUTO: 40.6 %
HGB BLD-MCNC: 12.4 G/DL
HGB BLD-MCNC: 13.2 G/DL
MCH RBC QN AUTO: 31.2 PG (ref 26–34)
MCHC RBC AUTO-ENTMCNC: 30.5 G/DL (ref 31–37)
MCV RBC AUTO: 102.3 FL
OSMOLALITY SERPL CALC.SUM OF ELEC: 301 MOSM/KG (ref 275–295)
PLATELET # BLD AUTO: 170 10(3)UL (ref 150–450)
POTASSIUM SERPL-SCNC: 4 MMOL/L (ref 3.5–5.1)
RBC # BLD AUTO: 3.97 X10(6)UL
SODIUM SERPL-SCNC: 142 MMOL/L (ref 136–145)
TROPONIN I SERPL HS-MCNC: 179 NG/L
WBC # BLD AUTO: 7.5 X10(3) UL (ref 4–11)

## 2024-01-05 PROCEDURE — 99232 SBSQ HOSP IP/OBS MODERATE 35: CPT | Performed by: STUDENT IN AN ORGANIZED HEALTH CARE EDUCATION/TRAINING PROGRAM

## 2024-01-05 RX ORDER — ROSUVASTATIN CALCIUM 5 MG/1
10 TABLET, COATED ORAL NIGHTLY
Status: DISCONTINUED | OUTPATIENT
Start: 2024-01-05 | End: 2024-01-07

## 2024-01-05 RX ORDER — ASPIRIN 81 MG/1
81 TABLET ORAL DAILY
Status: DISCONTINUED | OUTPATIENT
Start: 2024-01-06 | End: 2024-01-07

## 2024-01-05 RX ORDER — PANTOPRAZOLE SODIUM 40 MG/1
40 TABLET, DELAYED RELEASE ORAL
Status: DISCONTINUED | OUTPATIENT
Start: 2024-01-05 | End: 2024-01-07

## 2024-01-05 NOTE — CONSULTS
[facility]  Report of Inpatient Wound Care Consultation    Germain Ernandez Patient Status:  Inpatient    1935 MRN [id number]   Location [facility] 2NE-A Attending Shani Nelson MD   Hosp Day # 1 PCP KENIA FLOR     Reason for Consultation:  Left second toe, right second toe    History of Present Illness:  Germain Ernandez is a a(n) 88 year old male. Patient seen at bedside with a fellow wound care nurse for skin issues to toes.No open wound at this time.    History:  Past Medical History:   Diagnosis Date    Chronic kidney disease     Esophageal reflux     Essential hypertension     High blood pressure     High cholesterol     Hyperlipidemia     Peripheral vascular disease (HCC)     Renal disorder      History reviewed. No pertinent surgical history.   reports that he quit smoking about 48 years ago. His smoking use included cigarettes and cigars. He has never used smokeless tobacco. He reports current alcohol use. He reports that he does not use drugs.    Allergies:  @ALLERGY    Laboratory Data:  Lab Results   Component Value Date    WBC 7.5 2024    HGB 12.4 2024    HCT 40.6 2024    .0 2024    CREATSERUM 1.70 2024    BUN 34 2024     2024    K 4.0 2024     2024    CO2 25.0 2024    GLU 90 2024    CA 8.0 2024         Impression:  Wound 24 Toe (Comment which one) Right (Active)   Date First Assessed/Time First Assessed: 24 0630   Present on Original Admission: Yes  Location: (c) Toe (Comment which one)  Wound Location Orientation: Right      Assessments 2024  1:44 PM   Wound Image     Drainage Amount None   Wound Odor None   Shape No open wound at this time       Wound 24 Toe (Comment which one) Left (Active)   Date First Assessed/Time First Assessed: 24 0631   Location: (c) Toe (Comment which one)  Wound Location Orientation: Left      Assessments 2024  1:45 PM   Wound Image      Drainage Amount None   Wound Odor None   Shape No open wound at this time            Thank you for allowing me to participate in the care of your patient.  Time Spent 15 minutes, Thank you.    Addis Gaming RN, BSN [facility]   Wound Care pager [phone]  1/5/2024  1:47 PM

## 2024-01-05 NOTE — CONSULTS
[facility]                       Gastroenterology Consultation-[facility]    Germain Ernandez Patient Status:  Inpatient    1935 MRN [id number]   Location [facility] 2NE-A Attending Shani Nelson MD   Hosp Day # 1 PCP KENIA FLOR     Reason for consultation: Coffee ground emesis  HPI: Mr. Ernandez is a 87 y/o with a PMHx of GERD, PVD, HTN, hyperlipidemia, CKD, who presented to the ED on 1/3/23 with reports of chest pain; NSTEMi with troponin level noted 179. GI consulted for reports of coffee ground emesis. Patient is A/O x1-2; unable to provide pertinent health history. No family at bedside; clinical history provided by primary nurse who reports 3 episodes of coffee ground emesis, 2 episodes of black diarrheal stool and 1 episode of black diarrheal stool today. Per nurse patient is not currently taking Plavix. Per admission note patient has several episodes vomting at home prior to admission to the hospital.  He denies abdominal pain reporting constant nausea that won't go away. Initial Hgb 15.2 downtrended to 13.2 BUN 34, Cr 1.7. CT notes some questionable wall thickening of the colon possible non-specific colitis.   PMHx:   Past Medical History:   Diagnosis Date    Chronic kidney disease     Esophageal reflux     Essential hypertension     High blood pressure     High cholesterol     Hyperlipidemia     Peripheral vascular disease (HCC)     Renal disorder                 PSHx: History reviewed. No pertinent surgical history.  Medications:    [COMPLETED] sodium chloride 0.9 % IV bolus 1,000 mL  1,000 mL Intravenous Once    [COMPLETED] adenosine (Adenocard) 6 mg/2mL injection 6 mg  6 mg Intravenous Once    [COMPLETED] adenosine (Adenocard) 6 mg/2mL injection 12 mg  12 mg Intravenous Once    aspirin tab 325 mg  325 mg Oral Daily    nitroglycerin (Nitrostat) SL tab 0.4 mg  0.4 mg Sublingual Q5 Min PRN    cefTRIAXone (Rocephin) 2 g in D5W 100 mL IVPB-ADD  2 g Intravenous Q24H     acetaminophen (Tylenol Extra Strength) tab 1,000 mg  1,000 mg Oral Q4H PRN    melatonin tab 3 mg  3 mg Oral Nightly PRN    polyethylene glycol (PEG 3350) (Miralax) 17 g oral packet 17 g  17 g Oral Daily PRN    sennosides (Senokot) tab 17.2 mg  17.2 mg Oral Nightly PRN    bisacodyl (Dulcolax) 10 MG rectal suppository 10 mg  10 mg Rectal Daily PRN    fleet enema (Fleet) 7-19 GM/118ML rectal enema 133 mL  1 enema Rectal Once PRN    ondansetron (Zofran) 4 MG/2ML injection 4 mg  4 mg Intravenous Q6H PRN    metoclopramide (Reglan) 5 mg/mL injection 5 mg  5 mg Intravenous Q8H PRN    benzonatate (Tessalon) cap 200 mg  200 mg Oral TID PRN    sodium chloride 0.9% infusion   Intravenous Continuous    enoxaparin (Lovenox) 40 MG/0.4ML SUBQ injection 40 mg  40 mg Subcutaneous Daily    [COMPLETED] piperacillin-tazobactam (Zosyn) 4.5 g in dextrose 5% 100 mL IVPB-ADDV  4.5 g Intravenous Once    [] sodium chloride 0.9 % IV bolus 3,210 mL  30 mL/kg Intravenous Continuous    calcium carbonate (Tums) chewable tab 1,000 mg  1,000 mg Oral TID PRN    alum-mag hydroxide-simethicone (Maalox) 200-200-20 MG/5ML oral suspension 30 mL  30 mL Oral QID PRN    [COMPLETED] Perflutren Lipid Microsphere (DEFINITY) 6.52 MG/ML injection 1.5 mL  1.5 mL Intravenous ONCE PRN    vancomycin (Vancocin) cap 125 mg  125 mg Oral Daily    pantoprazole (Protonix) 40 mg in sodium chloride 0.9% PF 10 mL IV push  40 mg Intravenous Q12H    morphINE PF 4 MG/ML injection 4 mg  4 mg Intravenous Q30 Min PRN    [COMPLETED] ondansetron (Zofran) 4 MG/2ML injection 4 mg  4 mg Intravenous Once    [COMPLETED] iopamidol 76% (ISOVUE-370) injection for power injector  100 mL Intravenous ONCE PRN     Allergies: No Known Allergies  Social HX:   Social History     Socioeconomic History    Marital status:    Tobacco Use    Smoking status: Former     Types: Cigarettes, Cigars     Quit date:      Years since quittin.0    Smokeless tobacco: Never   Vaping Use     Vaping Use: Never used   Substance and Sexual Activity    Alcohol use: Yes     Comment: ocassional    Drug use: Never     Social Determinants of Health     Food Insecurity: No Food Insecurity (1/4/2024)    Food Insecurity     Food Insecurity: Never true   Transportation Needs: No Transportation Needs (1/4/2024)    Transportation Needs     Lack of Transportation: No   Housing Stability: Low Risk  (1/4/2024)    Housing Stability     Housing Instability: No      FamHx: The patient has no family history of colon cancer or other gastrointestinal malignancies;  No family history of ulcer disease, or inflammatory bowel disease  ROS:  In addition to the pertinent positives described above:            Infectious Disease:  No chronic infections or recent fevers prior to the acute illness            Cardiovascular: History of PVD, no CAD, prior MI, chest pain, or palpitations            Respiratory: No shortness of breath, asthma, copd, recurrent pneumonia            Hematologic: The patient reports no easy bruising, frequent gum bleeding or nose bleeding;  The patient has no history of known chronic anemia            Dermatologic: The patient reports no recent rashes or chronic skin disorders            Rheumatologic: The patient reports no history of chronic arthritis, myalgias, arthralgias            Genitourinary:  The patient reports no history of recurrent urinary tract infections, hematuria, dysuria, or nephrolithiasis           Psychiatric: The patient reports no history of depression, anxiety, suicidal ideation, or homicidal ideation           Oncologic: The patient reports no history of prior solid tumor or hematologic malignancy           ENT: The patient reports no hoarseness of voice, hearing loss, sinus congestion, tinnitus           Neurologic: The patient reports no history of seizure, stroke, or frequent headaches  PE: /82 (BP Location: Right arm)   Pulse 82   Temp 97.8 °F (36.6 °C) (Oral)   Resp 16    Ht 6' 1\" (1.854 m)   Wt 237 lb 4.8 oz (107.6 kg)   SpO2 95%   BMI 31.31 kg/m²   Gen: AAO x 1-2, able to speak in complete sentences  HENT: EOMI, PERRL, oropharynx is clear with moist mucosal membranes  Eyes: Sclerae are anicteric  Neck:  Supple without nuchal rigidity  CV: Regular rate and rhythm, with normal S1 and S2  Resp: Clear to auscultation bilaterally without wheezes; rubs, rhonchi, or rales  Abdomen: Soft, non-tender, non-distended with the presence of bowel sounds; No hepatosplenomegaly; no rebound or guarding; No ascites is clinically apparent; no tympany to percussion  Ext: No peripheral edema or cyanosis  Skin: Warm and dry  Psychiatric: Appropriate mood and congruent affect without obvious depression or anxiety  Labs:   Lab Results   Component Value Date    WBC 9.5 01/04/2024    HGB 12.7 01/04/2024    HCT 39.7 01/04/2024    .0 01/04/2024     Recent Labs   Lab 01/03/24  2239   *   BUN 25*   CREATSERUM 1.69*   CA 8.7      K 3.8      CO2 29.0     Recent Labs   Lab 01/04/24  2144   RBC 4.01   HGB 12.7*   HCT 39.7   MCV 99.0   MCH 31.7   MCHC 32.0   RDW 15.0   NEPRELIM 7.06   WBC 9.5   .0       Recent Labs   Lab 01/03/24  2239   ALT 18   AST 19                   Imaging:   CT chest a/p:  Impression   CONCLUSION:  There is no evidence of thoracic aortic aneurysm or dissection on this nongated exam.     There is some liquid stool present within the colon with some questionable wall thickening.  The colon is limited assessment without oral contrast.  Incomplete distension also somewhat limits assessment.  A nonspecific colitis or enteritis may be  possible.     Impression: Mr. Ernandez is a 87 y/o with a PMHx of GERD, PVD, HTN, hyperlipidemia, CKD, who presented to the ED on 1/3/23 with reports of chest pain; NSTEMi with troponin level noted 179. GI consulted for reports of coffee ground emesis and melena stools; 3 episodes of coffee ground emesis, 2 episodes of black  diarrheal stool and 1 episode of black diarrheal stool today. C-diff neg.  Initial Hgb 15.2 downtrended to 13.2; no overt bleeding. CT notes some questionable wall thickening of the colon possible non-specific colitis.     We will defer endoscopic evaluation at this time given recent NSTEMI. Based on history of GERD, previous reports of episodes of vomiting prior to hospital admission and now reports of coffee ground emesis and melena stools; source of bleeding likely r/t Nuvia Grant tear.     Recommendations:     Full liquids advance as tolerated.   PPI Daily  CBC/CMP in am; trend Hgb; transfuse pRBCs for Hgb <7  Will defer endoscopic evaluation at this time given recent NSTEMI and likely Nuvia Grant tear.     Attending addendum Dr. Bell to follow later today and provide formal, final recommendations at that time   FLAKITO Silva  8:29 AM  1/5/2024  [facility]  [phone]    Attending physician addendum:    I personally saw and examined the patient. I agree with the above comprehensive history, exam, assessment and plan.     Mr. Germain Ernandez is an 88 year-old male being seen in consultation for coffee ground emesis. His abdomen is soft and non tender. He is non-toxic appearing and in no acute distress. Labs and imaging were reviewed. He was admitted with chest pain found to be NSTEMI per interventional cardiology 2/2 infection. He had several episodes of emesis at home non bloody which turned coffee ground. His hgb is stable. This is a Nuvia-Grant tear by clinical history and given stability and NSTEMI, endoscopy is not recommended. Recommend PPI daily x 8 weeks. Please call if patient develops signs of worsening bleeding. We will sign off, please call with any questions.    LETITIA Bell  Gastroenterology/Advanced Endoscopy  [facility]

## 2024-01-05 NOTE — PLAN OF CARE
Assumed care of pt around 1930. Pt alert and oriented x4, denies any pain or shortness of breath. Lung sounds diminished bilaterally, NSR on tele. Pt with N/V, coffee ground emesis, LBM 1/5 and was black and watery. Bed locked and in lowest position, call light within reach.  POC: continue IV antibiotics, continue IV fluids, send stool sample for Cdiff, NPO, PRN medication for nausea/ emesis. Pt updated, all questions answered, verbalized understanding.    2124: On call hospitalist paged for x2 coffee ground emesis in 1 hour. See new orders (NPO and GI consult)  2135: GI consulted, now new orders at this time, will see in the am.    Problem: Patient/Family Goals  Goal: Patient/Family Long Term Goal  Description: Patient's Long Term Goal: stay out of the hospital    Interventions:  - take all medications as prescribed  - attend all follow up appointments  - See additional Care Plan goals for specific interventions  Outcome: Progressing  Goal: Patient/Family Short Term Goal  Description: Patient's Short Term Goal: go home    Interventions:   - take all medications as prescribed  - all testing as ordered by providers  - See additional Care Plan goals for specific interventions  Outcome: Progressing     Problem: PAIN - ADULT  Goal: Verbalizes/displays adequate comfort level or patient's stated pain goal  Description: INTERVENTIONS:  - Encourage pt to monitor pain and request assistance  - Assess pain using appropriate pain scale  - Administer analgesics based on type and severity of pain and evaluate response  - Implement non-pharmacological measures as appropriate and evaluate response  - Consider cultural and social influences on pain and pain management  - Manage/alleviate anxiety  - Utilize distraction and/or relaxation techniques  - Monitor for opioid side effects  - Notify MD/LIP if interventions unsuccessful or patient reports new pain  - Anticipate increased pain with activity and pre-medicate as  appropriate  Outcome: Progressing     Problem: RISK FOR INFECTION - ADULT  Goal: Absence of fever/infection during anticipated neutropenic period  Description: INTERVENTIONS  - Monitor WBC  - Administer growth factors as ordered  - Implement neutropenic guidelines  Outcome: Progressing     Problem: SAFETY ADULT - FALL  Goal: Free from fall injury  Description: INTERVENTIONS:  - Assess pt frequently for physical needs  - Identify cognitive and physical deficits and behaviors that affect risk of falls.  - [place] fall precautions as indicated by assessment.  - Educate pt/family on patient safety including physical limitations  - Instruct pt to call for assistance with activity based on assessment  - Modify environment to reduce risk of injury  - Provide assistive devices as appropriate  - Consider OT/PT consult to assist with strengthening/mobility  - Encourage toileting schedule  Outcome: Progressing     Problem: DISCHARGE PLANNING  Goal: Discharge to home or other facility with appropriate resources  Description: INTERVENTIONS:  - Identify barriers to discharge w/pt and caregiver  - Include patient/family/discharge partner in discharge planning  - Arrange for needed discharge resources and transportation as appropriate  - Identify discharge learning needs (meds, wound care, etc)  - Arrange for interpreters to assist at discharge as needed  - Consider post-discharge preferences of patient/family/discharge partner  - Complete POLST form as appropriate  - Assess patient's ability to be responsible for managing their own health  - Refer to Case Management Department for coordinating discharge planning if the patient needs post-hospital services based on physician/LIP order or complex needs related to functional status, cognitive ability or social support system  Outcome: Progressing

## 2024-01-05 NOTE — PAYOR COMM NOTE
--------------  ADMISSION REVIEW     Payor: [employer]  Subscriber #:  [id number]  Authorization Number: [id number]    Admit date: 1/4/24  Admit time:  3:30 AM       History   HPI  Patient is 88-year-old male presents emergency room for evaluation of chest pain.  Patient denies history of coronary disease.  Patient started with sudden onset of chest pressure around 9 PM while sitting in bed.  Patient denies shortness of breath.  Denies radiation of pain.  No abdominal pain.  4 episodes of vomiting.  Patient states pain feels like a pressure on his chest.  Patient denies history of coronary disease.  No history of PE or DVT.  Cardiac risk factors include hypertension, hyperlipidemia.    ED Triage Vitals [01/03/24 2239]   BP (!) 126/93   Pulse (!) 126   Resp 24   Temp 98.3 °F (36.8 °C)   Temp src Oral   SpO2 95 %   O2 Device None (Room air)     Physical Exam  GENERAL:  well appearing and non-toxic, Alert and oriented X 3   HEENT: Normocephalic, atraumatic.  Moist mucous membranes.  Pupils equal round reactive to light and accommodation, extraocular motion is intact, sclerae white, conjunctiva is pink.  Oropharynx is unremarkable, no exudate.  NECK: Supple, trachea midline, no lymphadenopathy.   LUNG: Lungs clear to auscultation bilaterally, no wheezing, no rales, no rhonchi.  CARDIOVASCULAR: Tachycardic regular rate and rhythm.  Normal S1S2.  No S3S4 or murmur.  ABDOMEN: Bowel sounds are present. Soft. nondistended, no pulsatile masses. nontender  MUSCULOSKELETAL: No calf tenderness.  Dorsalis and Posterior Tibial pulses present. No clubbing. No cyanosis.  No edema.   SKIN EXAMINATIoN: Warm and dry with normal appearance.  No rashes or lesions.  NEUROLOGICAL:  Motor strength intact all groups.  normal sensation, speech intact  Labs Reviewed   COMP METABOLIC PANEL (14) - Abnormal; Notable for the following components:       Result Value    Glucose 121 (*)     BUN 25 (*)     Creatinine 1.69 (*)     Calculated  Osmolality 298 (*)     eGFR-Cr 39 (*)     A/G Ratio 0.8 (*)     All other components within normal limits   TROPONIN I HIGH SENSITIVITY - Abnormal; Notable for the following components:    Troponin I (High Sensitivity) 85 (*)     All other components within normal limits   D-DIMER - Abnormal; Notable for the following components:    D-Dimer 1.94 (*)     All other components within normal limits   LACTIC ACID, PLASMA - Abnormal; Notable for the following components:    Lactic Acid 2.2 (*)     All other components within normal limits   URINALYSIS WITH CULTURE REFLEX - Abnormal; Notable for the following components:    Protein Urine 20 (*)     Nitrite Urine 2+ (*)     Leukocyte Esterase Urine 75 (*)     WBC Urine 11-20 (*)     Bacteria Urine 3+ (*)     Squamous Epi. Cells Few (*)     All other components within normal limits   CBC W/ DIFFERENTIAL - Abnormal; Notable for the following components:    WBC 15.1 (*)     Neutrophil Absolute Prelim 12.85 (*)     Neutrophil Absolute 12.85 (*)     Lymphocyte Absolute 0.80 (*)     XR CHEST AP PORTABLE  (CPT=71045)  Result Date: 1/3/2024  CONCLUSION:  No evidence of active cardiopulmonary disease.   LOCATION:  [facility]      Dictated by (CST): Evaristo Coelho MD on 1/03/2024 at 11:23 PM     Finalized by (CST): Evaristo Coelho MD on 1/03/2024 at 11:24 PM        CT chest, abdomen and pelvis as read by [facility] rad radiology showed no evidence for PE, dissection.  No pneumonia.  No evidence for acute intra-abdominal abnormality.  Nonobstructive bowel pattern.  Normal appendix.  Fluid is seen throughout the lumen to the rectum suspicious for viral diarrhea.  No colonic wall thickening.  Suspected small amount of gallbladder wall calcification versus gallstones.    Medications   morphINE PF 4 MG/ML injection 4 mg (4 mg Intravenous Given 1/3/24 5896)   aspirin tab 325 mg (has no administration in time range)   nitroglycerin (Nitrostat) SL tab 0.4 mg (has no administration in time range)    cefTRIAXone (Rocephin) 1 g in D5W 100 mL IVPB-ADD (has no administration in time range)   acetaminophen (Tylenol Extra Strength) tab 1,000 mg (has no administration in time range)   melatonin tab 3 mg (has no administration in time range)   polyethylene glycol (PEG 3350) (Miralax) 17 g oral packet 17 g (has no administration in time range)   sennosides (Senokot) tab 17.2 mg (has no administration in time range)   bisacodyl (Dulcolax) 10 MG rectal suppository 10 mg (has no administration in time range)   fleet enema (Fleet) 7-19 GM/118ML rectal enema 133 mL (has no administration in time range)   ondansetron (Zofran) 4 MG/2ML injection 4 mg (has no administration in time range)   metoclopramide (Reglan) 5 mg/mL injection 10 mg (has no administration in time range)   benzonatate (Tessalon) cap 200 mg (has no administration in time range)   sodium chloride 0.9% infusion (has no administration in time range)   enoxaparin (Lovenox) 40 MG/0.4ML SUBQ injection 40 mg (has no administration in time range)   sodium chloride 0.9 % IV bolus 3,210 mL (has no administration in time range)   ondansetron (Zofran) 4 MG/2ML injection 4 mg (4 mg Intravenous Given 1/3/24 2302)   iopamidol 76% (ISOVUE-370) injection for power injector (100 mL Intravenous Given 1/3/24 2352)   sodium chloride 0.9 % IV bolus 1,000 mL (1,000 mL Intravenous New Bag 1/4/24 0103)   adenosine (Adenocard) 6 mg/2mL injection 6 mg (6 mg Intravenous Given 1/4/24 0050)   adenosine (Adenocard) 6 mg/2mL injection 12 mg (12 mg Intravenous Given 1/4/24 0103)   piperacillin-tazobactam (Zosyn) 4.5 g in dextrose 5% 100 mL IVPB-ADDV (4.5 g Intravenous New Bag 1/4/24 0313)     Admission disposition: 1/4/2024  1:42 AM      Disposition and Plan   Clinical Impression:  1. Chest pain of uncertain etiology    2. Elevated troponin    Sepsis  Urinary tract infection  Elevated lactic acid    Disposition:  Admit  1/4/2024  1:42 am       History and Physical   History of Present  Illness:   Germain Ernandez is a 88 year old male with PMH CKD< Htn, HLD who p/w CP. Started at 9 PM while sitting in bed. Denies SOB, cough, radiation. Abd pain. The pain was severe and eventually started to vomit. Never had before.     /88   Pulse 107   Temp 99.5 °F (37.5 °C) (Temporal)   Resp 15   Ht 6' 1\" (1.854 m)   Wt 236 lb (107 kg)   SpO2 97%   BMI 31.14 kg/m²   General: Alert  Respiratory: No rhonchi, no wheezes  Cardiovascular: S1, S2. Regular rate and rhythm, tachycardic  Abdomen: Soft, Non-tender, non-distended, positive bowel sounds  Neuro: No new focal deficits  Extremities: No edema    Lab 01/03/24  2244   RBC 4.81   HGB 15.2   HCT 48.0   MCV 99.8   MCH 31.6   MCHC 31.7   RDW 14.8   NEPRELIM 12.85*   WBC 15.1*   .0     Lab 01/03/24  2239   *   BUN 25*   CREATSERUM 1.69*   EGFRCR 39*   CA 8.7   ALB 3.4      K 3.8      CO2 29.0   ALKPHO 101   AST 19   ALT 18   BILT 0.4   TP 7.8     Lab 01/03/24  2239   TROPHS 85*     Assessment & Plan:    #chest pain  #mildly elevated trop, likely demand ischemia  -cards  -trops  -asa    #SVT/sinus tachycardia  -s/p adenosine in ED, c/w sinus tach  -IVF  -likely related to infection as below    #n/v  -occurred after the pain started  -zofran    #low grade fever, leukocytosis  #suspected UTI  -iv abx  -cxs  -trend lactic  -IVF    #HTN  #HLD  #CKD          1/4/24  Patient  having acid reflux, heartburn     Temp:  [98.1 °F (36.7 °C)-99.5 °F (37.5 °C)] 98.1 °F (36.7 °C)  Pulse:  [] 89  Resp:  [15-24] 19  BP: ()/(74-93) 127/83  SpO2:  [89 %-99 %] 99 %     Physical Exam:    Respiratory: no wheezes, no rhonchi  Cardiovascular: S1, S2, regular rate and rhythm  Abdomen: Soft, Non-tender, non-distended, positive bowel sounds  Neuro: No new focal deficits.   Extremities: no edema    Lab 01/03/24  2244 01/04/24  0356   WBC 15.1* 15.5*   HGB 15.2 14.4   MCV 99.8 101.5*   .0 196.0      Lab 01/04/24  0356 01/04/24  0801  01/04/24  1240   TROPHS 111* 215* 273*   Medications:    [START ON 1/5/2024] aspirin  325 mg Oral Daily    cefTRIAXone  2 g Intravenous Q24H    enoxaparin  40 mg Subcutaneous Daily    vancomycin  125 mg Oral Daily         Assessment & Plan:       #chest pain  Troponin uptrending   Cards on CS- pt does not want invasive procedure  ASA     #SVT/sinus tachycardia  -s/p adenosine in ED, c/w sinus tach  -IVF  -likely related to infection as below     #n/v -resolved   -occurred after the pain started  -zofran     #low grade fever, leukocytosis  #suspected UTI  IV ceftriaxone, monitor cs     #h/o C Diff requiring fecal transplant  Po vanco ppx     #HTN  #HLD  #CKD     Cardiology Consult Note        87 yo male presents with chest pain described as severe chest burning reminiscent of his reflux however had significant vomiting which was different than his normal reflux, elevated troponin therefore cardiology consulted.   Now feels back to his normal self.  Has a history of neck surgery resulting in paralysis, full assist and lives in assisted living.  Denies chest pain, shortness of breath, lower extremity edema, orthopnea, PND palpitations or syncope.      ED work up  Lactic acid rising 3.1  Trop rising 215, EKG SVT, inferior Q waves are chronic however no old EKGs  WBC 15.5, macrocytosis        Assessment:    NSTEMI: Likely type II from infection with elevated lactic acid and dirty UA.  However echocardiogram with inferior wall hypokinesis with old inferior Q waves on EKG.  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  Said he would not want invasive procedures unless absolutely necessary.  SVT: Likely due to infection giving low-grade fever, leukocytosis, likely UTI  Possible UTI  Hypertension, hyperlipidemia, chronic kidney disease        Plan:  Trend troponin until downtrending  IV fluids  Will treat cardiac status conservatively      sodium chloride 0.9%  infusion  Rate: 83 mL/hr Freq: Continuous Route: IV  Start: 01/04/24 0345     MEDICATIONS ADMINISTERED IN LAST 1 DAY:  calcium carbonate (Tums) chewable tab 1,000 mg       Date Action Dose Route User    1/4/2024 2020 Given 1,000 mg Oral Elizabeth Stephenson RN    1/4/2024 1240 Given 1,000 mg Oral Ekaterina Guzmán RN          cefTRIAXone (Rocephin) 2 g in D5W 100 mL IVPB-ADD       Date Action Dose Route User    1/5/2024 0356 New Bag 2 g Intravenous Elizabeth Stephenson RN          metoclopramide (Reglan) 5 mg/mL injection 5 mg       Date Action Dose Route User    1/4/2024 2126 Given 5 mg Intravenous Elizabeth Stephenson RN          ondansetron (Zofran) 4 MG/2ML injection 4 mg       Date Action Dose Route User    1/4/2024 2020 Given 4 mg Intravenous Elizabeth Stephenson RN          pantoprazole (Protonix) 40 mg in sodium chloride 0.9% PF 10 mL IV push       Date Action Dose Route User    1/4/2024 2153 Given 40 mg Intravenous Elizabeth Stephenson RN          pantoprazole (Protonix) DR tab 20 mg       Date Action Dose Route User    1/4/2024 1522 Given 20 mg Oral Ekaterina Guzmán RN     sodium chloride 0.9% infusion       Date Action Dose Route User    1/5/2024 0356 New Bag (none) Intravenous Elizabeth Stephenson RN    1/4/2024 1702 New Bag (none) Intravenous Ekaterina Guzmán RN    1/4/2024 1339 Rate/Dose Change (none) Intravenous Ekaterina Guzmán RN    1/4/2024 1113 Rate/Dose Change (none) Intravenous Ekaterina Guzmán RN          vancomycin (Vancocin) cap 125 mg       Date Action Dose Route User    1/4/2024 1522 Given 125 mg Oral Ekaterina Guzmán RN            Vitals (last day)       Date/Time Temp Pulse Resp BP SpO2 Weight O2 Device O2 Flow Rate (L/min) [facility]    01/05/24 0615 98.4 °F (36.9 °C) 81 17 146/83 95 % -- None (Room air) -- SN    01/04/24 2315 98.5 °F (36.9 °C) -- 18 139/96 96 % -- None (Room air) -- SN    01/04/24 2006 98.7 °F (37.1 °C) 103 17 133/98 89 % -- None (Room air) --     01/04/24 1634 98.7 °F (37.1 °C) 92 18 134/83 94 % --  None (Room air) -- LS    01/04/24 0500 -- 100 -- 123/74 90 % -- None (Room air) -- EK    01/04/24 0405 -- -- -- -- -- 237 lb 4.8 oz -- -- EK    01/04/24 0108 99.5 °F (37.5 °C) -- -- -- -- -- None (Room air) -- RADHA    01/04/24 0100 -- 152 20 110/81 96 % -- -- -- RADHA    01/04/24 0030 -- 147 17 107/81 89 % -- -- -- RADHA

## 2024-01-05 NOTE — PROGRESS NOTES
[facility]     Hospitalist Progress Note     Germainji Ernandez Patient Status:  Inpatient    1935 MRN [id number]   formerly [facility] 2NE-A Attending Shani Nelson MD   Hosp Day # 1 PCP KENIA FLOR     Chief Complaint: N, V     Subjective:     Patient w/ coffee ground emesis, melena overnight ongoing this AM as well    Objective:    Review of Systems:   A comprehensive review of systems was completed; pertinent positive and negatives stated in subjective.    Vital signs:  Temp:  [97.8 °F (36.6 °C)-98.7 °F (37.1 °C)] 97.8 °F (36.6 °C)  Pulse:  [] 82  Resp:  [16-18] 16  BP: (133-151)/(82-98) 151/82  SpO2:  [89 %-96 %] 95 %    Physical Exam:    General: No acute distress  Respiratory: no wheezes, no rhonchi  Cardiovascular: S1, S2, regular rate and rhythm  Abdomen: Soft, Non-tender, non-distended, positive bowel sounds  Neuro: No new focal deficits.   Extremities: no edema      Diagnostic Data:    Labs:  Recent Labs   Lab 24  2244 24  0356 24  2144 24  0844   WBC 15.1* 15.5* 9.5 7.5   HGB 15.2 14.4 12.7* 12.4*   MCV 99.8 101.5* 99.0 102.3*   .0 196.0 194.0 170.0       Recent Labs   Lab 24  2239 24  0844   * 90   BUN 25* 34*   CREATSERUM 1.69* 1.70*   CA 8.7 8.0*   ALB 3.4  --     142   K 3.8 4.0    115*   CO2 29.0 25.0   ALKPHO 101  --    AST 19  --    ALT 18  --    BILT 0.4  --    TP 7.8  --        Estimated Creatinine Clearance: 35.3 mL/min (A) (based on SCr of 1.7 mg/dL (H)).    Recent Labs   Lab 24  0801 24  1240 24  0844   TROPHS 215* 273* 179*       No results for input(s): \"PTP\", \"INR\" in the last 168 hours.                 Microbiology    Hospital Encounter on 24   1. Blood Culture     Status: None (Preliminary result)    Collection Time: 24  1:53 AM    Specimen: Blood,peripheral   Result Value Ref Range    Blood Culture Result No Growth 1 Day N/A         Imaging: Reviewed in  Epic.    Medications:    pantoprazole  40 mg Oral BID AC    aspirin  325 mg Oral Daily    cefTRIAXone  2 g Intravenous Q24H    [Held by provider] enoxaparin  40 mg Subcutaneous Daily    vancomycin  125 mg Oral Daily       Assessment & Plan:    #Coffee ground emesis, melena  NPO  PPI  Trend h-H  GI on Cs    #chest pain  Troponin uptrending   Cards on CS- pt does not want invasive procedure  ASA     #SVT/sinus tachycardia  -s/p adenosine in ED, c/w sinus tach  -IVF  -likely related to infection as below     #n/v -resolved   -occurred after the pain started  -zofran     #low grade fever, leukocytosis  #suspected UTI  IV ceftriaxone, monitor cs    #h/o C Diff requiring fecal transplant  Po vanco ppx    #HTN  #HLD  #CKD    #GOC  Confirmed he is DNAR/C with JIMMIE Allen, Eliane Nelson MD    Supplementary Documentation:     Quality:  DVT Mechanical Prophylaxis:   SCDs,    DVT Pharmacologic Prophylaxis   Medication    [Held by provider] enoxaparin (Lovenox) 40 MG/0.4ML SUBQ injection 40 mg    DVT Pharmacologic prophylaxis: Aspirin 325 mg           Code Status: DNAR/Comfort Care  [place]: External urinary catheter in place  [place] Duration (in days):   Central line:    RACHEL: 1/8/2024    Discharge is dependent on: clinical  At this point Mr. Ernandez is expected to be discharge to: home    The 21st Century Cures Act makes medical notes like these available to patients in the interest of transparency. Please be advised this is a medical document. Medical documents are intended to carry relevant information, facts as evident, and the clinical opinion of the practitioner. The medical note is intended as peer to peer communication and may appear blunt or direct. It is written in medical language and may contain abbreviations or verbiage that are unfamiliar.             **Certification      PHYSICIAN Certification of Need for Inpatient Hospitalization - Initial Certification    Patient will require inpatient services that will  reasonably be expected to span two midnight's based on the clinical documentation in H+P.   Based on patients current state of illness, I anticipate that, after discharge, patient will require TBD.

## 2024-01-05 NOTE — PROGRESS NOTES
Progress Note  Germain Ernandez Patient Status:  Inpatient    1935 MRN [id number]   Location [facility] 2NE-A Attending Shani Nelson MD   Hosp Day # 1 PCP KENIA FLOR     Subjective:  Developed coffee ground emesis and black stool overnight. Feels good this morning, on room air, denies any cardiac symptoms at this time. Wants to go home.      Objective:  /82 (BP Location: Right arm)   Pulse 82   Temp 97.8 °F (36.6 °C) (Oral)   Resp 16   Ht 6' 1\" (1.854 m)   Wt 237 lb 4.8 oz (107.6 kg)   SpO2 95%   BMI 31.31 kg/m²     Telemetry: SR, HR 80s      Intake/Output:  No intake or output data in the 24 hours ending 24 0853    Last 3 Weights   24 0405 237 lb 4.8 oz (107.6 kg)   24 2239 236 lb (107 kg)       Labs:  Recent Labs   Lab 24  2239   *   BUN 25*   CREATSERUM 1.69*   EGFRCR 39*   CA 8.7      K 3.8      CO2 29.0     Recent Labs   Lab 24  2244 24  0356 24  2144   RBC 4.81 4.56 4.01   HGB 15.2 14.4 12.7*   HCT 48.0 46.3 39.7   MCV 99.8 101.5* 99.0   MCH 31.6 31.6 31.7   MCHC 31.7 31.1 32.0   RDW 14.8 15.0 15.0   NEPRELIM 12.85* 14.21* 7.06   WBC 15.1* 15.5* 9.5   .0 196.0 194.0         Recent Labs   Lab 24  0356 24  0801 24  1240   TROPHS 111* 215* 273*       Review of Systems   Constitutional: Negative.   Cardiovascular:  Positive for leg swelling.   Respiratory: Negative.         Physical Exam:    Gen: alert, oriented x 3, NAD  Heent: pupils equal, reactive. Mucous membranes moist.   Neck: no jvd  Cardiac: regular rate and rhythm, normal S1,S2, no murmur, gallop or rub   Lungs: CTA, diminished at the basis  Abd: soft, NT/ND +bs  Ext: lower extremities +1 edema  Skin: Warm, dry  Neuro: No focal deficits      Medications:     aspirin  325 mg Oral Daily    cefTRIAXone  2 g Intravenous Q24H    [Held by provider] enoxaparin  40 mg Subcutaneous Daily    vancomycin  125 mg Oral Daily    pantoprazole  40 mg  Intravenous Q12H      sodium chloride 83 mL/hr at 01/05/24 0356       Assessment:  NSTEMI likely d/t demand ischemia in the settings of active infection likely UTI  EKG showed SVT, inferior Q waves appear chronic   Trop 85>111>215>273  Elevated lactic on admit at 2.7  BC showed NGTD  Echo 1/4/24 LVEF 55-60%    SVT likley d/t infection with low grade fever and leukocytosis-now resolved   Leukocytosis with low grade temp, likley d/t UTI  Urine cultures pending   On abx    Coffee ground emesis and black stools  GI consulted  On PPI   HTN-fairly controlled, no on any BP medications   HLP-LDL 77, not on statin PTA-will review   CKD  PVD with hx of stents to left leg   Was on Plavix PTA   H/o C-Diff with fecal transplant in the past   On PO vanc   Hx of neck surgery resulting in paralysis     Plan:  Denies any cardiac symptoms.  Heart rates normalized, no arrhythmias noted on the EKG.  Echo results noted LVEF 55-60%.  GI consulted for coffee ground emesis and black stools to r/o GI bleed.   Hx of PVD with stents to LLE-Plavix currently held d/t above.       Currently NPO.  Would benefit from BB and statin with hx of PVD will review prior to dc.     Plan of care discussed with patient, RN.    Linda Gee, APRN  1/5/2024  8:53 AM  [phone]          I agree with above note and plan. I seen and examined the patient. The chart was reviewed and case was d/w rounding SATISH. I made clinical decisions independently.    The patient admitted with nausea and vomiting and chest pain with that.  Workup revealed urinary tract infection and treated with antibiotics.  A trigger reactive tachycardia.    In addition coffee-ground emesis and melena this morning.    Importantly history of neck surgery resulting in paralysis with full assist and lives in assisted living.    Troponin was mildly elevated 215 -mismatch rather than acute coronary event.  No acute ischemia on EKG with Q waves in inferior leads which could be  chronic.    Brief SVT yesterday -treated with adenosine in ER and stayed in sinus with borderline tachycardia.    Echo Doppler on admission -LVEF 55 to 60% with no LVH and diastolic function normal for patient's age.  Normal chamber sizes.  No significant valvular pathology - Calcified mitral and aortic valve but no stenosis or insufficiency.  Grossly no significant wall motion abnormalities.    Hemodynamically stable blood pressure 130-140/80-90 mmHg with a heart rate in 70s to 80s with resolution of tachycardia.    Peripheral arterial disease with history of PTA, not recent and was on Plavix which was held with coffee-ground emesis and melena on this admission.    Physical Exam:    Euvolemic by physical exam with paraplegia.    Laboratory data: Creatinine 1.7 BUN 34 potassium 4.0 sodium 142 glucose 90 elevated lactic acid 3.1 trending down.  Troponin 273 trending down to 179 hemoglobin 13.2 platelet count 170    Chest CT with no PE or aortic aneurysm.  No congestion of the lungs.  No lymphadenopathy.  Abdominal CT with no AAA, but nonspecific findings of nonspecific colitis or enteritis but nothing proven.  No acute changes.    Assessment: Nausea vomiting, urinary tract infection, atypical chest pain with mild elevated troponin with mismatch not acute coronary syndrome.  Preserved LV systolic function with no significant wall motion abnormalities or significant valvular pathology by echo.  Brief SVT with reactive tachycardia due to infection and vomiting in ER.  SVT treated with adenosine and no recurrence.  Blood pressure at the upper limit of normal.  Tachycardia resolved.  Plavix was for remote PTA and no needs to continue especially with suspected GI bleeding.  Patient was placed n.p.o. and GI was consulted.      Chest x-ray with no active cardiopulmonary disease.  No CHF.    Plan:  -No need for ischemic workup and family and team decided conservative approach  -GI was consulted for coffee-ground emesis and  melena hemoglobin stable  -Plavix was held/discontinued and is fine since there is no recent PTA  -Will start low-dose beta-blocker IV Lopressor 5 mg every 6 hours while patient is n.p.o. not to go to any tachycardia again and for borderline blood pressure at the upper limit of normal/mildly elevated  -Will switch IV to p.o. metoprolol if no GI procedures planned  -Gentle hydration while n.p.o.  -Add statin later with PAD  -Treat UTI  -Ambulate as able    Addendum: Since patient is on clear liquid diet and not n.p.o. anymore -will start metoprolol tartrate 25 mg p.o. twice a day and rosuvastatin 10 mg p.o. daily low-dose for peripheral arterial disease.  May resume Plavix 75 mg p.o. daily or no Plavix but baby aspirin.  There was aspirin 325 mg and the dose was lowered to baby dose if worried about any GI bleeding.    Cardiology will follow peripherally.  Will sign off please call with questions.    Discussed with the patient and nursing staff    Bridger Bingham M.D.  [facility] [place]    1/5/2024  4:17 PM  F/u3

## 2024-01-05 NOTE — PLAN OF CARE
Assumed pt care at approximately 0730. A&Ox4. Room air, pt reports acid reflux-type pain, no shortness of breath, vital signs stable, NSR on tele. Incontinent of bowel and bladder. Total assist.     Plan of care: IV fluids, abx, trend troponins     Plan of care updated with patient and family. Questions answered, pt verbalized understanding. Call light is within reach, all needs met at this time.    Problem: PAIN - ADULT  Goal: Verbalizes/displays adequate comfort level or patient's stated pain goal  Description: INTERVENTIONS:  - Encourage pt to monitor pain and request assistance  - Assess pain using appropriate pain scale  - Administer analgesics based on type and severity of pain and evaluate response  - Implement non-pharmacological measures as appropriate and evaluate response  - Consider cultural and social influences on pain and pain management  - Manage/alleviate anxiety  - Utilize distraction and/or relaxation techniques  - Monitor for opioid side effects  - Notify MD/LIP if interventions unsuccessful or patient reports new pain  - Anticipate increased pain with activity and pre-medicate as appropriate  Outcome: Progressing     Problem: RISK FOR INFECTION - ADULT  Goal: Absence of fever/infection during anticipated neutropenic period  Description: INTERVENTIONS  - Monitor WBC  - Administer growth factors as ordered  - Implement neutropenic guidelines  Outcome: Progressing     Problem: SAFETY ADULT - FALL  Goal: Free from fall injury  Description: INTERVENTIONS:  - Assess pt frequently for physical needs  - Identify cognitive and physical deficits and behaviors that affect risk of falls.  - [place] fall precautions as indicated by assessment.  - Educate pt/family on patient safety including physical limitations  - Instruct pt to call for assistance with activity based on assessment  - Modify environment to reduce risk of injury  - Provide assistive devices as appropriate  - Consider OT/PT consult to  assist with strengthening/mobility  - Encourage toileting schedule  Outcome: Progressing     Problem: DISCHARGE PLANNING  Goal: Discharge to home or other facility with appropriate resources  Description: INTERVENTIONS:  - Identify barriers to discharge w/pt and caregiver  - Include patient/family/discharge partner in discharge planning  - Arrange for needed discharge resources and transportation as appropriate  - Identify discharge learning needs (meds, wound care, etc)  - Arrange for interpreters to assist at discharge as needed  - Consider post-discharge preferences of patient/family/discharge partner  - Complete POLST form as appropriate  - Assess patient's ability to be responsible for managing their own health  - Refer to Case Management Department for coordinating discharge planning if the patient needs post-hospital services based on physician/LIP order or complex needs related to functional status, cognitive ability or social support system  Outcome: Progressing

## 2024-01-06 LAB
ALBUMIN SERPL-MCNC: 2.3 G/DL (ref 3.4–5)
ALBUMIN/GLOB SERPL: 0.6 {RATIO} (ref 1–2)
ALP LIVER SERPL-CCNC: 59 U/L
ALT SERPL-CCNC: 19 U/L
ANION GAP SERPL CALC-SCNC: 5 MMOL/L (ref 0–18)
AST SERPL-CCNC: 25 U/L (ref 15–37)
BASOPHILS # BLD AUTO: 0.04 X10(3) UL (ref 0–0.2)
BASOPHILS NFR BLD AUTO: 0.5 %
BILIRUB SERPL-MCNC: 0.1 MG/DL (ref 0.1–2)
BUN BLD-MCNC: 24 MG/DL (ref 9–23)
CALCIUM BLD-MCNC: 8.1 MG/DL (ref 8.5–10.1)
CHLORIDE SERPL-SCNC: 117 MMOL/L (ref 98–112)
CO2 SERPL-SCNC: 19 MMOL/L (ref 21–32)
CREAT BLD-MCNC: 1.23 MG/DL
EGFRCR SERPLBLD CKD-EPI 2021: 57 ML/MIN/1.73M2 (ref 60–?)
EOSINOPHIL # BLD AUTO: 0.84 X10(3) UL (ref 0–0.7)
EOSINOPHIL NFR BLD AUTO: 9.9 %
ERYTHROCYTE [DISTWIDTH] IN BLOOD BY AUTOMATED COUNT: 15 %
GLOBULIN PLAS-MCNC: 3.8 G/DL (ref 2.8–4.4)
GLUCOSE BLD-MCNC: 97 MG/DL (ref 70–99)
HCT VFR BLD AUTO: 38.7 %
HGB BLD-MCNC: 11.5 G/DL
HGB BLD-MCNC: 11.8 G/DL
IMM GRANULOCYTES # BLD AUTO: 0.06 X10(3) UL (ref 0–1)
IMM GRANULOCYTES NFR BLD: 0.7 %
LYMPHOCYTES # BLD AUTO: 1.91 X10(3) UL (ref 1–4)
LYMPHOCYTES NFR BLD AUTO: 22.4 %
MCH RBC QN AUTO: 31.2 PG (ref 26–34)
MCHC RBC AUTO-ENTMCNC: 29.7 G/DL (ref 31–37)
MCV RBC AUTO: 104.9 FL
MONOCYTES # BLD AUTO: 0.79 X10(3) UL (ref 0.1–1)
MONOCYTES NFR BLD AUTO: 9.3 %
NEUTROPHILS # BLD AUTO: 4.87 X10 (3) UL (ref 1.5–7.7)
NEUTROPHILS # BLD AUTO: 4.87 X10(3) UL (ref 1.5–7.7)
NEUTROPHILS NFR BLD AUTO: 57.2 %
OSMOLALITY SERPL CALC.SUM OF ELEC: 296 MOSM/KG (ref 275–295)
PLATELET # BLD AUTO: 171 10(3)UL (ref 150–450)
POTASSIUM SERPL-SCNC: 3.7 MMOL/L (ref 3.5–5.1)
PROT SERPL-MCNC: 6.1 G/DL (ref 6.4–8.2)
RBC # BLD AUTO: 3.69 X10(6)UL
SODIUM SERPL-SCNC: 141 MMOL/L (ref 136–145)
WBC # BLD AUTO: 8.5 X10(3) UL (ref 4–11)

## 2024-01-06 PROCEDURE — 99233 SBSQ HOSP IP/OBS HIGH 50: CPT | Performed by: HOSPITALIST

## 2024-01-06 RX ORDER — POTASSIUM CHLORIDE 20 MEQ/1
40 TABLET, EXTENDED RELEASE ORAL ONCE
Status: COMPLETED | OUTPATIENT
Start: 2024-01-06 | End: 2024-01-06

## 2024-01-06 NOTE — PLAN OF CARE
Pt AOx4. O2 sats maintained on RA, denies SOB. NSR on tele. LMB this am - black. Pt denies pain. IVF as ordered. Pt tolerating full liquid diet, denies nausea.     10:48: Dr. Last notified of BMs and Hgb trend. OK to advance diet if tolerated and check CBC in am.   Problem: Patient/Family Goals  Goal: Patient/Family Long Term Goal  Description: Patient's Long Term Goal: stay out of the hospital    Interventions:  - take all medications as prescribed  - attend all follow up appointments  - See additional Care Plan goals for specific interventions  Outcome: Progressing  Goal: Patient/Family Short Term Goal  Description: Patient's Short Term Goal: go home    Interventions:   - take all medications as prescribed  - all testing as ordered by providers  - See additional Care Plan goals for specific interventions  Outcome: Progressing     Problem: PAIN - ADULT  Goal: Verbalizes/displays adequate comfort level or patient's stated pain goal  Description: INTERVENTIONS:  - Encourage pt to monitor pain and request assistance  - Assess pain using appropriate pain scale  - Administer analgesics based on type and severity of pain and evaluate response  - Implement non-pharmacological measures as appropriate and evaluate response  - Consider cultural and social influences on pain and pain management  - Manage/alleviate anxiety  - Utilize distraction and/or relaxation techniques  - Monitor for opioid side effects  - Notify MD/LIP if interventions unsuccessful or patient reports new pain  - Anticipate increased pain with activity and pre-medicate as appropriate  Outcome: Progressing     Problem: RISK FOR INFECTION - ADULT  Goal: Absence of fever/infection during anticipated neutropenic period  Description: INTERVENTIONS  - Monitor WBC  - Administer growth factors as ordered  - Implement neutropenic guidelines  Outcome: Progressing     Problem: SAFETY ADULT - FALL  Goal: Free from fall injury  Description: INTERVENTIONS:  - Assess  pt frequently for physical needs  - Identify cognitive and physical deficits and behaviors that affect risk of falls.  - [place] fall precautions as indicated by assessment.  - Educate pt/family on patient safety including physical limitations  - Instruct pt to call for assistance with activity based on assessment  - Modify environment to reduce risk of injury  - Provide assistive devices as appropriate  - Consider OT/PT consult to assist with strengthening/mobility  - Encourage toileting schedule  Outcome: Progressing     Problem: DISCHARGE PLANNING  Goal: Discharge to home or other facility with appropriate resources  Description: INTERVENTIONS:  - Identify barriers to discharge w/pt and caregiver  - Include patient/family/discharge partner in discharge planning  - Arrange for needed discharge resources and transportation as appropriate  - Identify discharge learning needs (meds, wound care, etc)  - Arrange for interpreters to assist at discharge as needed  - Consider post-discharge preferences of patient/family/discharge partner  - Complete POLST form as appropriate  - Assess patient's ability to be responsible for managing their own health  - Refer to Case Management Department for coordinating discharge planning if the patient needs post-hospital services based on physician/LIP order or complex needs related to functional status, cognitive ability or social support system  Outcome: Progressing

## 2024-01-06 NOTE — PLAN OF CARE
PT A&OX4 RA, 1st degree block on tele. Bilateral breath south diminished. Denied cough  Denied CP. Denied SOB. BM today large, soft and black. Incontinent both bladder and bowl. Paraplegic and L arm precaution. Bruising BUE, pitting edema both lower feet.    Updated POC to pt, all needs meet at this time.   Call light within reach, bed alarm on for pt safety.     POC:   IV ABX  0.9 NS 83cc/hr    Problem: Patient/Family Goals  Goal: Patient/Family Long Term Goal  Description: Patient's Long Term Goal: stay out of the hospital    Interventions:  - take all medications as prescribed  - attend all follow up appointments  - See additional Care Plan goals for specific interventions  Outcome: Progressing  Goal: Patient/Family Short Term Goal  Description: Patient's Short Term Goal: go home    Interventions:   - take all medications as prescribed  - all testing as ordered by providers  - See additional Care Plan goals for specific interventions  Outcome: Progressing     Problem: PAIN - ADULT  Goal: Verbalizes/displays adequate comfort level or patient's stated pain goal  Description: INTERVENTIONS:  - Encourage pt to monitor pain and request assistance  - Assess pain using appropriate pain scale  - Administer analgesics based on type and severity of pain and evaluate response  - Implement non-pharmacological measures as appropriate and evaluate response  - Consider cultural and social influences on pain and pain management  - Manage/alleviate anxiety  - Utilize distraction and/or relaxation techniques  - Monitor for opioid side effects  - Notify MD/LIP if interventions unsuccessful or patient reports new pain  - Anticipate increased pain with activity and pre-medicate as appropriate  Outcome: Progressing     Problem: RISK FOR INFECTION - ADULT  Goal: Absence of fever/infection during anticipated neutropenic period  Description: INTERVENTIONS  - Monitor WBC  - Administer growth factors as ordered  - Implement neutropenic  guidelines  Outcome: Progressing     Problem: SAFETY ADULT - FALL  Goal: Free from fall injury  Description: INTERVENTIONS:  - Assess pt frequently for physical needs  - Identify cognitive and physical deficits and behaviors that affect risk of falls.  - [place] fall precautions as indicated by assessment.  - Educate pt/family on patient safety including physical limitations  - Instruct pt to call for assistance with activity based on assessment  - Modify environment to reduce risk of injury  - Provide assistive devices as appropriate  - Consider OT/PT consult to assist with strengthening/mobility  - Encourage toileting schedule  Outcome: Progressing     Problem: DISCHARGE PLANNING  Goal: Discharge to home or other facility with appropriate resources  Description: INTERVENTIONS:  - Identify barriers to discharge w/pt and caregiver  - Include patient/family/discharge partner in discharge planning  - Arrange for needed discharge resources and transportation as appropriate  - Identify discharge learning needs (meds, wound care, etc)  - Arrange for interpreters to assist at discharge as needed  - Consider post-discharge preferences of patient/family/discharge partner  - Complete POLST form as appropriate  - Assess patient's ability to be responsible for managing their own health  - Refer to Case Management Department for coordinating discharge planning if the patient needs post-hospital services based on physician/LIP order or complex needs related to functional status, cognitive ability or social support system  Outcome: Progressing

## 2024-01-06 NOTE — PROGRESS NOTES
[facility]     Hospitalist Progress Note     Germainji Ernandez Patient Status:  Inpatient    1935 MRN [id number]   [facility] 2NE-A Attending Shain Nelson MD   Hosp Day # 2 PCP KENIA FLOR     Chief Complaint: N, V     Subjective:     Patient w/ coffee ground emesis, melena overnight ongoing this AM as well    Objective:    Review of Systems:   A comprehensive review of systems was completed; pertinent positive and negatives stated in subjective.    Vital signs:  Temp:  [97.8 °F (36.6 °C)-98.3 °F (36.8 °C)] 98 °F (36.7 °C)  Pulse:  [] 63  Resp:  [16-20] 20  BP: (125-156)/(75-91) 130/90  SpO2:  [90 %-97 %] 95 %    Physical Exam:    General: No acute distress  Respiratory: no wheezes, no rhonchi  Cardiovascular: S1, S2, regular rate and rhythm  Abdomen: Soft, Non-tender, non-distended, positive bowel sounds  Neuro: No new focal deficits.   Extremities: no edema      Diagnostic Data:    Labs:  Recent Labs   Lab 24  2244 24  0356 24  2144 24  0844 24  1439 24  0018 24  0853   WBC 15.1* 15.5* 9.5 7.5  --   --  8.5   HGB 15.2 14.4 12.7* 12.4* 13.2 11.8* 11.5*   MCV 99.8 101.5* 99.0 102.3*  --   --  104.9*   .0 196.0 194.0 170.0  --   --  171.0       Recent Labs   Lab 24  2239 24  0844 24  0853   * 90 97   BUN 25* 34* 24*   CREATSERUM 1.69* 1.70* 1.23   CA 8.7 8.0* 8.1*   ALB 3.4  --  2.3*    142 141   K 3.8 4.0 3.7    115* 117*   CO2 29.0 25.0 19.0*   ALKPHO 101  --  59   AST 19  --  25   ALT 18  --  19   BILT 0.4  --  0.1   TP 7.8  --  6.1*       Estimated Creatinine Clearance: 48.7 mL/min (based on SCr of 1.23 mg/dL).    Recent Labs   Lab 24  0801 24  1240 24  0844   TROPHS 215* 273* 179*       No results for input(s): \"PTP\", \"INR\" in the last 168 hours.                 Microbiology    Hospital Encounter on 24   1. Urine Culture, Routine     Status: Abnormal (Preliminary  result)    Collection Time: 01/04/24  2:17 AM    Specimen: Urine, clean catch   Result Value Ref Range    Urine Culture 50,000-99,000 CFU/ML Citrobacter braakii (A) N/A   2. Blood Culture     Status: None (Preliminary result)    Collection Time: 01/04/24  1:53 AM    Specimen: Blood,peripheral   Result Value Ref Range    Blood Culture Result No Growth 2 Days N/A         Imaging: Reviewed in Epic.    Medications:    potassium chloride  40 mEq Oral Once    pantoprazole  40 mg Oral BID AC    aspirin  81 mg Oral Daily    rosuvastatin  10 mg Oral Nightly    metoprolol tartrate  25 mg Oral 2x Daily(Beta Blocker)    cefTRIAXone  2 g Intravenous Q24H    [Held by provider] enoxaparin  40 mg Subcutaneous Daily    vancomycin  125 mg Oral Daily       Assessment & Plan:    #Coffee ground emesis resolved  melena  PPI  Trend h-H  GI on Cs    #chest pain  Troponin uptrending   Cards on CS- pt does not want invasive procedure  ASA     #SVT/sinus tachycardia  -s/p adenosine in ED, c/w sinus tach  -IVF  -likely related to infection as below     #n/v -resolved   -occurred after the pain started  -zofran     #low grade fever, leukocytosis  #suspected UTI  IV ceftriaxone, monitor cs    #h/o C Diff requiring fecal transplant  Po vanco ppx    #HTN  #HLD  #CKD3    #GOC  Confirmed he is DNAR/C           Supplementary Documentation:     Quality:  DVT Mechanical Prophylaxis:   SCDs,    DVT Pharmacologic Prophylaxis   Medication    [Held by provider] enoxaparin (Lovenox) 40 MG/0.4ML SUBQ injection 40 mg         DVT Pharmacologic prophylaxis: Aspirin 81 mg      Code Status: DNAR/Comfort Care  [place]: External urinary catheter in place  [place] Duration (in days):   Central line:    RACHEL: 1/7/2024    Discharge is dependent on: clinical  At this point Mr. Ernandez is expected to be discharge to: home    The 21st Century Cures Act makes medical notes like these available to patients in the interest of transparency. Please be advised this is a medical  document. Medical documents are intended to carry relevant information, facts as evident, and the clinical opinion of the practitioner. The medical note is intended as peer to peer communication and may appear blunt or direct. It is written in medical language and may contain abbreviations or verbiage that are unfamiliar.             **Certification      PHYSICIAN Certification of Need for Inpatient Hospitalization - Initial Certification    Patient will require inpatient services that will reasonably be expected to span two midnight's based on the clinical documentation in H+P.   Based on patients current state of illness, I anticipate that, after discharge, patient will require TBD.

## 2024-01-06 NOTE — PLAN OF CARE
Assumed pt care at approximately 0730. A&Ox4. Room air, pt reports no pain, no shortness of breath, vital signs stable, NSR on tele. Incontinent of bowel and bladder. Total assist.      Plan of care: IV fluids, abx, PPI BID     Plan of care updated with patient. Questions answered, pt verbalized understanding. Call light is within reach, all needs met at this time.    Problem: PAIN - ADULT  Goal: Verbalizes/displays adequate comfort level or patient's stated pain goal  Description: INTERVENTIONS:  - Encourage pt to monitor pain and request assistance  - Assess pain using appropriate pain scale  - Administer analgesics based on type and severity of pain and evaluate response  - Implement non-pharmacological measures as appropriate and evaluate response  - Consider cultural and social influences on pain and pain management  - Manage/alleviate anxiety  - Utilize distraction and/or relaxation techniques  - Monitor for opioid side effects  - Notify MD/LIP if interventions unsuccessful or patient reports new pain  - Anticipate increased pain with activity and pre-medicate as appropriate  Outcome: Progressing     Problem: RISK FOR INFECTION - ADULT  Goal: Absence of fever/infection during anticipated neutropenic period  Description: INTERVENTIONS  - Monitor WBC  - Administer growth factors as ordered  - Implement neutropenic guidelines  Outcome: Progressing     Problem: SAFETY ADULT - FALL  Goal: Free from fall injury  Description: INTERVENTIONS:  - Assess pt frequently for physical needs  - Identify cognitive and physical deficits and behaviors that affect risk of falls.  - [place] fall precautions as indicated by assessment.  - Educate pt/family on patient safety including physical limitations  - Instruct pt to call for assistance with activity based on assessment  - Modify environment to reduce risk of injury  - Provide assistive devices as appropriate  - Consider OT/PT consult to assist with strengthening/mobility  -  Encourage toileting schedule  Outcome: Progressing     Problem: DISCHARGE PLANNING  Goal: Discharge to home or other facility with appropriate resources  Description: INTERVENTIONS:  - Identify barriers to discharge w/pt and caregiver  - Include patient/family/discharge partner in discharge planning  - Arrange for needed discharge resources and transportation as appropriate  - Identify discharge learning needs (meds, wound care, etc)  - Arrange for interpreters to assist at discharge as needed  - Consider post-discharge preferences of patient/family/discharge partner  - Complete POLST form as appropriate  - Assess patient's ability to be responsible for managing their own health  - Refer to Case Management Department for coordinating discharge planning if the patient needs post-hospital services based on physician/LIP order or complex needs related to functional status, cognitive ability or social support system  Outcome: Progressing

## 2024-01-07 VITALS
TEMPERATURE: 98 F | HEART RATE: 77 BPM | SYSTOLIC BLOOD PRESSURE: 137 MMHG | DIASTOLIC BLOOD PRESSURE: 88 MMHG | RESPIRATION RATE: 20 BRPM | HEIGHT: 73 IN | WEIGHT: 237.31 LBS | OXYGEN SATURATION: 97 % | BODY MASS INDEX: 31.45 KG/M2

## 2024-01-07 LAB
BASOPHILS # BLD AUTO: 0.03 X10(3) UL (ref 0–0.2)
BASOPHILS NFR BLD AUTO: 0.3 %
EOSINOPHIL # BLD AUTO: 0.82 X10(3) UL (ref 0–0.7)
EOSINOPHIL NFR BLD AUTO: 9.2 %
ERYTHROCYTE [DISTWIDTH] IN BLOOD BY AUTOMATED COUNT: 14.6 %
HCT VFR BLD AUTO: 36.5 %
HGB BLD-MCNC: 11.5 G/DL
IMM GRANULOCYTES # BLD AUTO: 0.04 X10(3) UL (ref 0–1)
IMM GRANULOCYTES NFR BLD: 0.4 %
LYMPHOCYTES # BLD AUTO: 2.19 X10(3) UL (ref 1–4)
LYMPHOCYTES NFR BLD AUTO: 24.6 %
MCH RBC QN AUTO: 31 PG (ref 26–34)
MCHC RBC AUTO-ENTMCNC: 31.5 G/DL (ref 31–37)
MCV RBC AUTO: 98.4 FL
MONOCYTES # BLD AUTO: 0.7 X10(3) UL (ref 0.1–1)
MONOCYTES NFR BLD AUTO: 7.9 %
NEUTROPHILS # BLD AUTO: 5.11 X10 (3) UL (ref 1.5–7.7)
NEUTROPHILS # BLD AUTO: 5.11 X10(3) UL (ref 1.5–7.7)
NEUTROPHILS NFR BLD AUTO: 57.6 %
PLATELET # BLD AUTO: 173 10(3)UL (ref 150–450)
POTASSIUM SERPL-SCNC: 3.3 MMOL/L (ref 3.5–5.1)
RBC # BLD AUTO: 3.71 X10(6)UL
WBC # BLD AUTO: 8.9 X10(3) UL (ref 4–11)

## 2024-01-07 RX ORDER — ASPIRIN 81 MG/1
81 TABLET ORAL DAILY
Qty: 30 TABLET | Refills: 0 | Status: SHIPPED | OUTPATIENT
Start: 2024-01-08

## 2024-01-07 RX ORDER — VANCOMYCIN HYDROCHLORIDE 125 MG/1
125 CAPSULE ORAL DAILY
Qty: 7 CAPSULE | Refills: 0 | Status: SHIPPED | OUTPATIENT
Start: 2024-01-08 | End: 2024-01-15

## 2024-01-07 RX ORDER — CIPROFLOXACIN 500 MG/1
500 TABLET, FILM COATED ORAL 2 TIMES DAILY
Qty: 14 TABLET | Refills: 0 | Status: SHIPPED | OUTPATIENT
Start: 2024-01-07

## 2024-01-07 RX ORDER — POTASSIUM CHLORIDE 20 MEQ/1
40 TABLET, EXTENDED RELEASE ORAL EVERY 4 HOURS
Status: COMPLETED | OUTPATIENT
Start: 2024-01-07 | End: 2024-01-07

## 2024-01-07 RX ORDER — CIPROFLOXACIN 500 MG/1
500 TABLET, FILM COATED ORAL
Status: DISCONTINUED | OUTPATIENT
Start: 2024-01-07 | End: 2024-01-07

## 2024-01-07 NOTE — PLAN OF CARE
Call made to [facility] to give report on pt, no answer, will attempt again in a few minutes.     13:30 second call attempt to [facility], after hours voicemail received.     14:22: Daughter, Eliane, notified of planned transport back to [facility].

## 2024-01-07 NOTE — CM/SW NOTE
01/07/24 1146   Discharge disposition   Expected discharge disposition subacute   Post Acute Care Provider Ciera Rosas   Discharge transportation [facility]     Plan for patient discharge to [facility] today at 2pm via [facility], PCS form completed for transport.  RN to call facility at [phone] for transfer report and update patient's family on dc plan.     [address] [address]  Phone: [phone]    Patsy Pablo RN Case Manager [phone]

## 2024-01-07 NOTE — PLAN OF CARE
NURSING DISCHARGE NOTE    Discharged Nursing home via Ambulance.  Accompanied by Support staff  Belongings Taken by patient/family.    Tele and IV removed without complications.  All discharge education and information provided to patient, all questions answered. [facility]

## 2024-01-07 NOTE — PLAN OF CARE
PT A&OX4 RA, 1st degree block on tele. Bilateral breath south diminished. Denied cough  Denied CP. Denied SOB. BM Reported this morning soft and black. Incontinent both bladder and bowl. Paraplegic and L arm precaution. Bruising BUE, pitting edema both lower feet.    Updated POC to pt, all needs meet at this time.   Call light within reach, bed alarm on for pt safety.      POC:   IV ABX  0.9 NS 83cc/hr  Redraw Hgb in the morning    Problem: Patient/Family Goals  Goal: Patient/Family Long Term Goal  Description: Patient's Long Term Goal: stay out of the hospital    Interventions:  - take all medications as prescribed  - attend all follow up appointments  - See additional Care Plan goals for specific interventions  Outcome: Progressing  Goal: Patient/Family Short Term Goal  Description: Patient's Short Term Goal: go home    Interventions:   - take all medications as prescribed  - all testing as ordered by providers  - See additional Care Plan goals for specific interventions  Outcome: Progressing     Problem: PAIN - ADULT  Goal: Verbalizes/displays adequate comfort level or patient's stated pain goal  Description: INTERVENTIONS:  - Encourage pt to monitor pain and request assistance  - Assess pain using appropriate pain scale  - Administer analgesics based on type and severity of pain and evaluate response  - Implement non-pharmacological measures as appropriate and evaluate response  - Consider cultural and social influences on pain and pain management  - Manage/alleviate anxiety  - Utilize distraction and/or relaxation techniques  - Monitor for opioid side effects  - Notify MD/LIP if interventions unsuccessful or patient reports new pain  - Anticipate increased pain with activity and pre-medicate as appropriate  Outcome: Progressing     Problem: RISK FOR INFECTION - ADULT  Goal: Absence of fever/infection during anticipated neutropenic period  Description: INTERVENTIONS  - Monitor WBC  - Administer growth factors as  ordered  - Implement neutropenic guidelines  Outcome: Progressing     Problem: SAFETY ADULT - FALL  Goal: Free from fall injury  Description: INTERVENTIONS:  - Assess pt frequently for physical needs  - Identify cognitive and physical deficits and behaviors that affect risk of falls.  - [place] fall precautions as indicated by assessment.  - Educate pt/family on patient safety including physical limitations  - Instruct pt to call for assistance with activity based on assessment  - Modify environment to reduce risk of injury  - Provide assistive devices as appropriate  - Consider OT/PT consult to assist with strengthening/mobility  - Encourage toileting schedule  Outcome: Progressing     Problem: DISCHARGE PLANNING  Goal: Discharge to home or other facility with appropriate resources  Description: INTERVENTIONS:  - Identify barriers to discharge w/pt and caregiver  - Include patient/family/discharge partner in discharge planning  - Arrange for needed discharge resources and transportation as appropriate  - Identify discharge learning needs (meds, wound care, etc)  - Arrange for interpreters to assist at discharge as needed  - Consider post-discharge preferences of patient/family/discharge partner  - Complete POLST form as appropriate  - Assess patient's ability to be responsible for managing their own health  - Refer to Case Management Department for coordinating discharge planning if the patient needs post-hospital services based on physician/LIP order or complex needs related to functional status, cognitive ability or social support system  Outcome: Progressing     Problem: Patient/Family Goals  Goal: Patient/Family Long Term Goal  Description: Patient's Long Term Goal: stay out of the hospital    Interventions:  - take all medications as prescribed  - attend all follow up appointments  - See additional Care Plan goals for specific interventions  Outcome: Progressing  Goal: Patient/Family Short Term  Goal  Description: Patient's Short Term Goal: go home    Interventions:   - take all medications as prescribed  - all testing as ordered by providers  - See additional Care Plan goals for specific interventions  Outcome: Progressing     Problem: PAIN - ADULT  Goal: Verbalizes/displays adequate comfort level or patient's stated pain goal  Description: INTERVENTIONS:  - Encourage pt to monitor pain and request assistance  - Assess pain using appropriate pain scale  - Administer analgesics based on type and severity of pain and evaluate response  - Implement non-pharmacological measures as appropriate and evaluate response  - Consider cultural and social influences on pain and pain management  - Manage/alleviate anxiety  - Utilize distraction and/or relaxation techniques  - Monitor for opioid side effects  - Notify MD/LIP if interventions unsuccessful or patient reports new pain  - Anticipate increased pain with activity and pre-medicate as appropriate  Outcome: Progressing     Problem: RISK FOR INFECTION - ADULT  Goal: Absence of fever/infection during anticipated neutropenic period  Description: INTERVENTIONS  - Monitor WBC  - Administer growth factors as ordered  - Implement neutropenic guidelines  Outcome: Progressing     Problem: SAFETY ADULT - FALL  Goal: Free from fall injury  Description: INTERVENTIONS:  - Assess pt frequently for physical needs  - Identify cognitive and physical deficits and behaviors that affect risk of falls.  - [place] fall precautions as indicated by assessment.  - Educate pt/family on patient safety including physical limitations  - Instruct pt to call for assistance with activity based on assessment  - Modify environment to reduce risk of injury  - Provide assistive devices as appropriate  - Consider OT/PT consult to assist with strengthening/mobility  - Encourage toileting schedule  Outcome: Progressing     Problem: DISCHARGE PLANNING  Goal: Discharge to home or other facility with  appropriate resources  Description: INTERVENTIONS:  - Identify barriers to discharge w/pt and caregiver  - Include patient/family/discharge partner in discharge planning  - Arrange for needed discharge resources and transportation as appropriate  - Identify discharge learning needs (meds, wound care, etc)  - Arrange for interpreters to assist at discharge as needed  - Consider post-discharge preferences of patient/family/discharge partner  - Complete POLST form as appropriate  - Assess patient's ability to be responsible for managing their own health  - Refer to Case Management Department for coordinating discharge planning if the patient needs post-hospital services based on physician/LIP order or complex needs related to functional status, cognitive ability or social support system  Outcome: Progressing

## 2024-01-07 NOTE — PLAN OF CARE
Pt AOx4. O2 sats maintained on RA, denies SOB. NSR on tele. Incontinent of bowel and bladder, LBM 1/6. IVF infusing per order. K replaced through electrolyte protocol. Possible discharge planning back to [facility], pt updated on plan of care.    Problem: Patient/Family Goals  Goal: Patient/Family Long Term Goal  Description: Patient's Long Term Goal: stay out of the hospital    Interventions:  - take all medications as prescribed  - attend all follow up appointments  - See additional Care Plan goals for specific interventions  Outcome: Progressing  Goal: Patient/Family Short Term Goal  Description: Patient's Short Term Goal: go home    Interventions:   - take all medications as prescribed  - all testing as ordered by providers  - See additional Care Plan goals for specific interventions  Outcome: Progressing     Problem: PAIN - ADULT  Goal: Verbalizes/displays adequate comfort level or patient's stated pain goal  Description: INTERVENTIONS:  - Encourage pt to monitor pain and request assistance  - Assess pain using appropriate pain scale  - Administer analgesics based on type and severity of pain and evaluate response  - Implement non-pharmacological measures as appropriate and evaluate response  - Consider cultural and social influences on pain and pain management  - Manage/alleviate anxiety  - Utilize distraction and/or relaxation techniques  - Monitor for opioid side effects  - Notify MD/LIP if interventions unsuccessful or patient reports new pain  - Anticipate increased pain with activity and pre-medicate as appropriate  Outcome: Progressing     Problem: RISK FOR INFECTION - ADULT  Goal: Absence of fever/infection during anticipated neutropenic period  Description: INTERVENTIONS  - Monitor WBC  - Administer growth factors as ordered  - Implement neutropenic guidelines  Outcome: Progressing     Problem: SAFETY ADULT - FALL  Goal: Free from fall injury  Description: INTERVENTIONS:  - Assess pt frequently for physical  needs  - Identify cognitive and physical deficits and behaviors that affect risk of falls.  - [place] fall precautions as indicated by assessment.  - Educate pt/family on patient safety including physical limitations  - Instruct pt to call for assistance with activity based on assessment  - Modify environment to reduce risk of injury  - Provide assistive devices as appropriate  - Consider OT/PT consult to assist with strengthening/mobility  - Encourage toileting schedule  Outcome: Progressing     Problem: DISCHARGE PLANNING  Goal: Discharge to home or other facility with appropriate resources  Description: INTERVENTIONS:  - Identify barriers to discharge w/pt and caregiver  - Include patient/family/discharge partner in discharge planning  - Arrange for needed discharge resources and transportation as appropriate  - Identify discharge learning needs (meds, wound care, etc)  - Arrange for interpreters to assist at discharge as needed  - Consider post-discharge preferences of patient/family/discharge partner  - Complete POLST form as appropriate  - Assess patient's ability to be responsible for managing their own health  - Refer to Case Management Department for coordinating discharge planning if the patient needs post-hospital services based on physician/LIP order or complex needs related to functional status, cognitive ability or social support system  Outcome: Progressing

## 2024-01-07 NOTE — CM/SW NOTE
CM sent clinical updates to [facility] via [employer] system and requested bed for discharge today, awaiting response from facility admissions to finalize dc plan.      Patsy Pablo RN Case Manager [phone]

## 2024-01-09 NOTE — PAYOR COMM NOTE
--------------  DISCHARGE REVIEW    Payor: [employer] ADV PPO  Subscriber #:  [id number]  Authorization Number: [id number]    Admit date: 1/4/24  Admit time:   3:30 AM  Discharge Date: 1/7/2024  2:55 PM     Admitting Physician: Murphy Smith MD  Attending Physician:  No att. providers found  Primary Care Physician: Isaac Wagner       Discharge Summary Notes    No notes of this type exist for this encounter.             01/07/24 1146   Discharge disposition   Expected discharge disposition subacute   Post Acute Care Provider Ciera Rosas   Discharge transportation [facility]      Plan for patient discharge to [facility] today at 2pm via [facility], PCS form completed for transport.  RN to call facility at [phone] for transfer report and update patient's family on dc plan.      [address] [address]

## 2024-01-09 NOTE — PAYOR COMM NOTE
ASKING FOR RECONSIDERATION INPT  NSTEMI:   DEVELOPED  COFFEE GROUND EMESIS & BLACK STOOLS ON 1/5    CONTINUED STAY REVIEW    Payor: [employer] ADV PPO  Subscriber #:  [id number]  Authorization Number: [id number]    Admit date: 1/4/24  Admit time:  3:30 AM    Admitting Physician: Murphy Smith MD  Attending Physician:  Johanne att. providers found  Primary Care Physician: Isaac Wagner       Date/Time Temp Pulse Resp BP SpO2 Weight O2 Device O2 Flow Rate (L/min) [facility]    01/07/24 1226 97.5 °F (36.4 °C) 77 20 137/88 97 % -- None (Room air) --     01/07/24 1010 -- 69 -- -- 97 % -- -- --     01/07/24 0809 -- -- -- 146/95 -- -- -- --     01/07/24 0809 97.9 °F (36.6 °C) -- 20 -- -- -- None (Room air) --     01/07/24 0532 -- 66 -- -- 96 % -- -- --     01/07/24 0415 97.9 °F (36.6 °C) 68 20 152/94 94 % -- None (Room air) 0 L/min N    01/06/24 2300 -- 57 -- 137/88 93 % -- -- --     01/06/24 2300 97.9 °F (36.6 °C) -- 19 -- -- -- None (Room air) -- TN    01/06/24 2015 -- 58 -- -- 95 % -- -- --     01/06/24 1935 97.6 °F (36.4 °C) 67 19 140/79 97 % -- None (Room air) 0 L/min N    01/06/24 1600 97.6 °F (36.4 °C) 64 19 149/92 97 % -- None (Room air) --     01/06/24 1217 97.8 °F (36.6 °C) 65 19 139/88 96 % -- None (Room air) --     01/06/24 0833 98 °F (36.7 °C) 63 20 130/90 95 % -- None (Room air) -- NN    01/06/24 0415 98.2 °F (36.8 °C) 73 17 125/75 94 % -- None (Room air) --        1/5 GI CONSULT NOTE  Reason for consultation: Coffee ground emesis  HPI: Mr. Ernandez is a 87 y/o with a PMHx of GERD, PVD, HTN, hyperlipidemia, CKD, who presented to the ED on 1/3/23 with reports of chest pain; NSTEMi with troponin level noted 179. GI consulted for reports of coffee ground emesis. Patient is A/O x1-2; unable to provide pertinent health history. No family at bedside; clinical history provided by primary nurse who reports 3 episodes of coffee ground emesis, 2 episodes of black diarrheal stool and 1 episode of black  diarrheal stool today. Per nurse patient is not currently taking Plavix. Per admission note patient has several episodes vomting at home prior to admission to the hospital.  He denies abdominal pain reporting constant nausea that won't go away. Initial Hgb 15.2 downtrended to 13.2 BUN 34, Cr 1.7. CT notes some questionable wall thickening of the colon possible non-specific colitis.   PE: /82 (BP Location: Right arm)   Pulse 82   Temp 97.8 °F (36.6 °C) (Oral)   Resp 16   Ht 6' 1\" (1.854 m)   Wt 237 lb 4.8 oz (107.6 kg)   SpO2 95%   BMI 31.31 kg/m²   Abdomen: Soft, non-tender, non-distended with the presence of bowel sounds; No hepatosplenomegaly; no rebound or guarding; No ascites is clinically apparent; no tympany to percussion         Lab Results   Component Value Date     WBC 9.5 01/04/2024     HGB 12.7 01/04/2024     HCT 39.7 01/04/2024     .0 01/04/2024     Impression: Mr. Ernandez is a 89 y/o with a PMHx of GERD, PVD, HTN, hyperlipidemia, CKD, who presented to the ED on 1/3/23 with reports of chest pain; NSTEMi with troponin level noted 179. GI consulted for reports of coffee ground emesis and melena stools; 3 episodes of coffee ground emesis, 2 episodes of black diarrheal stool and 1 episode of black diarrheal stool today. C-diff neg.  Initial Hgb 15.2 downtrended to 13.2; no overt bleeding. CT notes some questionable wall thickening of the colon possible non-specific colitis.      We will defer endoscopic evaluation at this time given recent NSTEMI. Based on history of GERD, previous reports of episodes of vomiting prior to hospital admission and now reports of coffee ground emesis and melena stools; source of bleeding likely r/t Nuvia Grant tear.      Recommendations:      Full liquids advance as tolerated.   PPI Daily  CBC/CMP in am; trend Hgb; transfuse pRBCs for Hgb <7  Will defer endoscopic evaluation at this time given recent NSTEMI and likely Nuvia Grant tear.     1/5 CARDIOLOGY  NOTE  Subjective:  Developed coffee ground emesis and black stool overnight. Feels good this morning, on room air, denies any cardiac symptoms at this time.     Objective:  /82 (BP Location: Right arm)   Pulse 82   Temp 97.8 °F (36.6 °C) (Oral)   Resp 16   Ht 6' 1\" (1.854 m)   Wt 237 lb 4.8 oz (107.6 kg)   SpO2 95%   BMI 31.31 kg/m²      Telemetry: SR, HR 80s       Medications:      aspirin  325 mg Oral Daily    cefTRIAXone  2 g Intravenous Q24H    [Held by provider] enoxaparin  40 mg Subcutaneous Daily    vancomycin  125 mg Oral Daily    pantoprazole  40 mg Intravenous Q12H       sodium chloride 83 mL/hr at 01/05/24 0356         Assessment:  NSTEMI likely d/t demand ischemia in the settings of active infection likely UTI  EKG showed SVT, inferior Q waves appear chronic   Trop 85>111>215>273  Elevated lactic on admit at 2.7  BC showed NGTD  Echo 1/4/24 LVEF 55-60%    SVT likley d/t infection with low grade fever and leukocytosis-now resolved   Leukocytosis with low grade temp, likley d/t UTI  Urine cultures pending   On abx    Coffee ground emesis and black stools  GI consulted  On PPI   HTN-fairly controlled, no on any BP medications   HLP-LDL 77, not on statin PTA-will review   CKD  PVD with hx of stents to left leg   Was on Plavix PTA   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  Heart rates normalized, no arrhythmias noted on the EKG.  Echo results noted LVEF 55-60%.  GI consulted for coffee ground emesis and black stools to r/o GI bleed.   Hx of PVD with stents to LLE-Plavix currently held d/t above.       Currently NPO.  Would benefit from BB and statin with hx of PVD will review prior to dc.     1/5 HOSPITALIST NOTE    Subjective:   Patient w/ coffee ground emesis, melena overnight ongoing this AM as well    Vital signs:  Temp:  [97.8 °F (36.6 °C)-98.7 °F (37.1 °C)] 97.8 °F (36.6 °C)  Pulse:   [] 82  Resp:  [16-18] 16  BP: (133-151)/(82-98) 151/82  SpO2:  [89 %-96 %] 95 %    Recent Labs   Lab 01/03/24 2244 01/04/24  0356 01/04/24 2144 01/05/24  0844   WBC 15.1* 15.5* 9.5 7.5   HGB 15.2 14.4 12.7* 12.4*   MCV 99.8 101.5* 99.0 102.3*   .0 196.0 194.0 170.0              Recent Labs   Lab 01/03/24  2239 01/05/24  0844   * 90   BUN 25* 34*   CREATSERUM 1.69* 1.70*   CA 8.7 8.0*   ALB 3.4  --     142   K 3.8 4.0    115*   CO2 29.0 25.0   ALKPHO 101  --    AST 19  --    ALT 18  --    BILT 0.4  --    TP 7.8  --        Medications:    pantoprazole  40 mg Oral BID AC    aspirin  325 mg Oral Daily    cefTRIAXone  2 g Intravenous Q24H    [Held by provider] enoxaparin  40 mg Subcutaneous Daily    vancomycin  125 mg Oral Daily               Assessment & Plan:   #Coffee ground emesis, melena  NPO  PPI  Trend h-H  GI on Cs     #chest pain  Troponin uptrending   Cards on CS- pt does not want invasive procedure  ASA     #SVT/sinus tachycardia  -s/p adenosine in ED, c/w sinus tach  -IVF  -likely related to infection as below     #n/v -resolved   -occurred after the pain started  -zofran     #low grade fever, leukocytosis  #suspected UTI  IV ceftriaxone, monitor cs     #h/o C Diff requiring fecal transplant  Po vanco ppx     #HTN  #HLD  #CKD     #GOC  Confirmed he is DNAR/C with Eliane Novoa      1/6 HOSPITALIST NOTE    Subjective:   Patient w/ coffee ground emesis, melena overnight ongoing this AM as well           Objective:   Review of Systems:   A comprehensive review of systems was completed; pertinent positive and negatives stated in subjective.     Vital signs:  Temp:  [97.8 °F (36.6 °C)-98.3 °F (36.8 °C)] 98 °F (36.7 °C)  Pulse:  [] 63  Resp:  [16-20] 20  BP: (125-156)/(75-91) 130/90  SpO2:  [90 %-97 %] 95 %    Recent Labs   Lab 01/03/24  2244 01/04/24  0356 01/04/24  2144 01/05/24  0844 01/05/24  1439 01/06/24  0018 01/06/24  0853   WBC 15.1* 15.5* 9.5 7.5  --   --  8.5    HGB 15.2 14.4 12.7* 12.4* 13.2 11.8* 11.5*   MCV 99.8 101.5* 99.0 102.3*  --   --  104.9*   .0 196.0 194.0 170.0  --   --  171.0               Recent Labs   Lab 01/03/24  2239 01/05/24  0844 01/06/24  0853   * 90 97   BUN 25* 34* 24*   CREATSERUM 1.69* 1.70* 1.23   CA 8.7 8.0* 8.1*   ALB 3.4  --  2.3*    142 141   K 3.8 4.0 3.7    115* 117*   CO2 29.0 25.0 19.0*   ALKPHO 101  --  59   AST 19  --  25   ALT 18  --  19   BILT 0.4  --  0.1   TP 7.8  --  6.1*             Recent Labs   Lab 01/04/24  0801 01/04/24  1240 01/05/24  0844   TROPHS 215* 273* 179*        Medications:    potassium chloride  40 mEq Oral Once    pantoprazole  40 mg Oral BID AC    aspirin  81 mg Oral Daily    rosuvastatin  10 mg Oral Nightly    metoprolol tartrate  25 mg Oral 2x Daily(Beta Blocker)    cefTRIAXone  2 g Intravenous Q24H    [Held by provider] enoxaparin  40 mg Subcutaneous Daily    vancomycin  125 mg Oral Daily               Assessment & Plan:   #Coffee ground emesis resolved  melena  PPI  Trend h-H  GI on Cs     #chest pain  Troponin uptrending   Cards on CS- pt does not want invasive procedure  ASA     #SVT/sinus tachycardia  -s/p adenosine in ED, c/w sinus tach  -IVF  -likely related to infection as below     #n/v -resolved   -occurred after the pain started  -zofran     #low grade fever, leukocytosis  #suspected UTI  IV ceftriaxone, monitor cs     #h/o C Diff requiring fecal transplant  Po vanco ppx     #HTN  #HLD  #CKD3     #GOC  Confirmed he is DNAR/C

## 2024-01-22 NOTE — DISCHARGE SUMMARY
[facility]     Germain Ernandez Patient Status:  Inpatient    1935 MRN [id number]   Location [facility] 2NE-A Attending No att. providers found   Hosp Day # 3 PCP KENIA FLOR     Date of Admission: 1/3/2024  Date of Discharge:  2024     Discharge Disposition: SNF Subacute Rehab    Discharge Diagnosis:  #Coffee ground emesis resolved  melena  PPI  Trend h-H  GI on Cs     #chest pain  Troponin uptrending   Cards on CS- pt does not want invasive procedure  ASA     #SVT/sinus tachycardia  -s/p adenosine in ED, c/w sinus tach  -IVF  -likely related to infection as below     #n/v -resolved   -occurred after the pain started  -zofran     #low grade fever, leukocytosis  #suspected UTI  IV ceftriaxone, monitor cs     #h/o C Diff requiring fecal transplant  Po vanco ppx     #HTN  #HLD  #CKD3     #GOC  Confirmed he is DNAR/C        History of Present Illness: 88 year old male with PMH CKD< Htn, HLD who p/w CP. Started at 9 PM while sitting in bed. Denies SOB, cough, radiation. Abd pain. The pain was severe and eventually started to vomit. Never had before.        Brief Synopsis: Patient was evaluated by cardiology and GI, he was treated conservatevely for nstemi and coffee ground emesis.    Lace+ Score: 48  59-90 High Risk  29-58 Medium Risk  0-28   Low Risk       TCM Follow-Up Recommendation:  LACE 29-58: Moderate Risk of readmission after discharge from the hospital.      Procedures during hospitalization:       Incidental or significant findings and recommendations (brief descriptions):      Lab/Test results pending at Discharge:       Consultants:  Cardiology  GI    Discharge Medication List:     Discharge Medications        START taking these medications        Instructions Prescription details   aspirin 81 MG Tbec      Take 1 tablet (81 mg total) by mouth daily.   Quantity: 30 tablet  Refills: 0     ciprofloxacin 500 MG Tabs  Commonly known as: Cipro      Take 1 tablet (500  mg total) by mouth 2 (two) times daily.   Quantity: 14 tablet  Refills: 0            CONTINUE taking these medications        Instructions Prescription details   acetaminophen 325 MG Tabs  Commonly known as: Tylenol      Take 1 tablet (325 mg total) by mouth every 4 (four) hours as needed for Pain.   Refills: 0     acidophilus-pectin Caps  Commonly known as: Probiotic      Take 1 capsule by mouth 2 (two) times daily.   Refills: 0     bisacodyl 10 MG Supp  Commonly known as: Dulcolax      Place 1 suppository (10 mg total) rectally daily.   Refills: 0     dextromethorphan-guaiFENesin  MG/5ML Syrp  Commonly known as: ROBITUSSIN DM      Take 5 mL by mouth every 4 (four) hours as needed.   Refills: 0     gabapentin 300 MG Caps  Commonly known as: Neurontin      Take 1 capsule (300 mg total) by mouth 3 (three) times daily.   Refills: 0     pantoprazole 20 MG Tbec  Commonly known as: Protonix      Take 1 tablet (20 mg total) by mouth 2 (two) times daily before meals.   Refills: 0     polyethylene glycol (PEG 3350) 17 g Pack  Commonly known as: Miralax      Take 17 g by mouth 2 (two) times daily as needed.   Refills: 0     traZODone 100 MG Tabs  Commonly known as: Desyrel      Take 1 tablet (100 mg total) by mouth nightly.   Refills: 0            STOP taking these medications      Cholestyramine 4 g Pack  Commonly known as: QUESTRAN        clopidogrel 75 MG Tabs  Commonly known as: Plavix               ASK your doctor about these medications        Instructions Prescription details   vancomycin 125 MG Caps  Commonly known as: Vancocin  Ask about: Should I take this medication?      Take 1 capsule (125 mg total) by mouth daily for 7 days.   Stop taking on: January 15, 2024  Quantity: 7 capsule  Refills: 0               Where to Get Your Medications        Please  your prescriptions at the location directed by your doctor or nurse    Bring a paper prescription for each of these medications  aspirin 81 MG  Tbec  ciprofloxacin 500 MG Tabs  vancomycin 125 MG Caps         [place] reviewed:     Follow-up appointment:   Jimmy Wagnerq  [address]  [address]  [facility] [address] [phone]    Call in 1 week(s)      Donn Bingham MD  [address]  [address] FLO[address]  [facility] [id number] [phone]    Call  As needed    Jarod Bell DO  [address]   [place] [id number] [phone]    Call  If symptoms worsen    Appointments for Next 30 Days 2024 - 2024      None            Vital signs:       Physical Exam:    General: No acute distress   Lungs: clear to auscultation  Cardiovascular: S1, S2  Abdomen: Soft      -----------------------------------------------------------------------------------------------  PATIENT DISCHARGE INSTRUCTIONS: See electronic chart    Letty Gaspar MD    Total time spent on discharge plannin  minutes     The  [employer] Act makes medical notes like these available to patients in the interest of transparency. Please be advised this is a medical document. Medical documents are intended to carry relevant information, facts as evident, and the clinical opinion of the practitioner. The medical note is intended as peer to peer communication and may appear blunt or direct. It is written in medical language and may contain abbreviations or verbiage that are unfamiliar.

## 2024-01-25 NOTE — CDS QUERY
CLINICAL DOCUMENTATION CLARIFICATION FORM  Dear Dr. Gaspar,  The medical record contains conflicting documentation of elevated troponin and NSTEMI. Please clarify:   PLEASE SELECT THE MOST APPROPRIATE RESPONSE BELOW,   BASED UPON YOUR CLINICAL JUDGEMENT:    [   ]  Elevated troponin with NSTEMI ruled out  [   ]  Type 1 Non- ST elevation MI (NSTEMI)  [   ]  Type 2 MI/Demand ischemia due to (please indicate cause)______________  [   ]    Other (please specify):_________________________    DOCUMENTATION FROM THE MEDICAL RECORD    Risk Factors: UTI -  HTN - HL - CKD - Paraplegia     Clinical Indicators:  -1/4/24 Cardiology consult: Assessment:    NSTEMI: Likely type II from infection with elevated lactic acid and dirty UA.  However echocardiogram with inferior wall hypokinesis with old inferior Q waves  on EKG.    -1/5 Cardiologist: Assessment:  NSTEMI likely d/t demand ischemia in the settings of active infection likely UTI  EKG showed SVT, inferior Q waves appear chronic  Trop 85>111>215>273  Assessment: atypical chest pain with mild elevated troponin with mismatch not acute coronary syndrome.    -D/C Summary: Discharge diagnosis: #chest pain  Troponin uptrending  Cards on CS- pt does not want invasive procedure ASA  …#low grade fever, leukocytosis  #suspected UTI    Brief Synopsis:    Patient was evaluated by cardiology and GI, he was treated conservatevely for nstemi and coffee ground emesis.        Treatment:    Cardiologist consult  - EKG -  Echocardiogram - Serial labs - UTI treatment IV ceftriaxone   Questions about this query, please contact Clinical : Karolyn Cole RN, [phone]                                                        THIS FORM IS A PERMANENT PART OF THE MEDICAL RECORD

## (undated) NOTE — IP AVS SNAPSHOT
Patient Demographics     Address  [address]  [place] [address] Phone  [phone] (Home) *Preferred*  [phone] (Mobile) E-mail Address  [email or web address]      Patient Contacts     Name Relation Home Work Mobile    SASHA ALMAZAN Power of    [phone]    CHIQUI PARRA Power of    [phone]      Allergies as of 1/7/2024  Review status set to Review Complete on 1/4/2024   No Known Allergies     Code Status Information     Code Status    DNAR/Comfort Care        Patient Instructions       Check cbc 1/8/2024 and if stable resume Plavix daily as well     Follow-up Information     Isaac Wagner. Call in 1 week(s).    Specialty: Internal Medicine  Contact information:  [address]  [address]  [facility] [address] [phone]             Donn Bingham MD. Call.    Specialty: CARDIOLOGY  Why: As needed  Contact information:  [address] [facility]  [address] [facility] [address] [phone]             Jarod Bell DO. Call.    Specialty: GASTROENTEROLOGY  Why: If symptoms worsen  Contact information:  [address] [facility] [address] [phone]                        Your Home Meds List      TAKE these medications       Instructions Authorizing Provider Morning Afternoon Evening As Needed   acetaminophen 325 MG Tabs  Commonly known as: Tylenol      Take 1 tablet (325 mg total) by mouth every 4 (four) hours as needed for Pain.          acidophilus-pectin Caps  Commonly known as: Probiotic  Next dose due: 1/8/24      Take 1 capsule by mouth 2 (two) times daily.          aspirin 81 MG Tbec  Start taking on: January 8, 2024  Next dose due: 1/8/24      Take 1 tablet (81 mg total) by mouth daily.   Letty Olaru         bisacodyl 10 MG Supp  Commonly known as: Dulcolax  Next dose due: 1/8/24      Place 1 suppository (10 mg total) rectally daily.          ciprofloxacin 500 MG Tabs  Commonly known as: Cipro  Next dose due: 1/7/24 PM      Take 1 tablet (500 mg total)  by mouth 2 (two) times daily.   Lettyankit Gaspar         dextromethorphan-guaiFENesin  MG/5ML Syrp  Commonly known as: ROBITUSSIN DM      Take 5 mL by mouth every 4 (four) hours as needed.          gabapentin 300 MG Caps  Commonly known as: Neurontin  Next dose due: 1/8/24      Take 1 capsule (300 mg total) by mouth 3 (three) times daily.          pantoprazole 20 MG Tbec  Commonly known as: Protonix  Next dose due: 1/7/24 PM      Take 1 tablet (20 mg total) by mouth 2 (two) times daily before meals.          polyethylene glycol (PEG 3350) 17 g Pack  Commonly known as: Miralax      Take 17 g by mouth 2 (two) times daily as needed.          traZODone 100 MG Tabs  Commonly known as: Desyrel  Next dose due: 1/7/24 @ bedtime      Take 1 tablet (100 mg total) by mouth nightly.          vancomycin 125 MG Caps  Commonly known as: Vancocin  Start taking on: January 8, 2024  Next dose due: 1/8/24       Take 1 capsule (125 mg total) by mouth daily for 7 days.  Stop taking on: January 15, 2024   Lettyankit Gaspar               Where to Get Your Medications      Please  your prescriptions at the location directed by your doctor or nurse    Bring a paper prescription for each of these medications  aspirin 81 MG Tbec  ciprofloxacin 500 MG Tabs  vancomycin 125 MG Caps           [id number] - MAR ACTION REPORT  (last 48 hrs)    ** SITE UNKNOWN **     Order ID Medication Name Action Time Action Reason Comments    [id number] aspirin DR tab 81 mg 01/06/24 0837 Given      [id number] aspirin DR tab 81 mg 01/07/24 0917 Given      [id number] ciprofloxacin (Cipro) tab 500 mg 01/07/24 1237 Given      [id number] melatonin tab 3 mg 01/06/24 0321 Given      [id number] melatonin tab 3 mg 01/06/24 2232 Given      [id number] metoprolol tartrate (Lopressor) tab 25 mg 01/05/24 1753 Given      [id number] metoprolol tartrate (Lopressor) tab 25 mg 01/06/24 0603 Given      [id number] metoprolol tartrate (Lopressor) tab 25 mg 01/06/24 1612 Given      [id number]  metoprolol tartrate (Lopressor) tab 25 mg 01/07/24 0511 Given      [id number] pantoprazole (Protonix) DR tab 40 mg 01/05/24 1753 Given      [id number] pantoprazole (Protonix) DR tab 40 mg 01/06/24 0601 Given      [id number] pantoprazole (Protonix) DR tab 40 mg 01/06/24 1612 Given      [id number] pantoprazole (Protonix) DR tab 40 mg 01/07/24 0511 Given      [id number] potassium chloride (K-Dur) tab 40 mEq 01/06/24 1225 Given      [id number] potassium chloride (K-Dur) tab 40 mEq 01/07/24 0917 Given      [id number] potassium chloride (K-Dur) tab 40 mEq 01/07/24 1237 Given      [id number] rosuvastatin (Crestor) tab 10 mg 01/05/24 2012 Given      [id number] rosuvastatin (Crestor) tab 10 mg 01/06/24 2036 Given      [id number] sodium chloride 0.9% infusion 01/06/24 0415 New Bag      [id number] sodium chloride 0.9% infusion 01/06/24 1612 New Bag      [id number] sodium chloride 0.9% infusion 01/07/24 0426 New Bag      [id number] vancomycin (Vancocin) cap 125 mg 01/06/24 0837 Given      [id number] vancomycin (Vancocin) cap 125 mg 01/07/24 0917 Given              Recent Vital Signs    Flowsheet Row Most Recent Value   /88 Filed at 01/07/2024 1226   Pulse 77 Filed at 01/07/2024 1226   Resp 20 Filed at 01/07/2024 1226   Temp 97.5 °F (36.4 °C) Filed at 01/07/2024 1226   SpO2 97 % Filed at 01/07/2024 1226      Patient's Most Recent Weight    Flowsheet Row Most Recent Value   Patient Weight 107.6 kg (237 lb 4.8 oz)         Lab Results Last 24 Hours      Potassium [[id number]] (Abnormal)  Resulted: 01/07/24 0830, Result status: Final result   Ordering provider: Letty Gaspar MD  01/06/24 2300 Resulting lab: [facility] ([facility])    Specimen Information    Type Source Collected On   Blood — 01/07/24 0730          Components    Component Value Reference Range Flag Lab   Potassium 3.3 3.5 - 5.1 mmol/L [facility] ([facility], 1928[facility])            CBC With Differential With Platelet [[id number]] (Abnormal)  Resulted: 01/07/24 0820, Result  status: Final result   Ordering provider: Juvencio Last MD  01/06/24 2300 Resulting lab: [facility] ([facility])   Narrative:  The following orders were created for panel order CBC With Differential With Platelet.  Procedure                               Abnormality         Status                     ---------                               -----------         ------                     CBC W/ DIFFERENTIAL[[id number]]          Abnormal            Final result                 Please view results for these tests on the individual orders.    Specimen Information    Type Source Collected On   Blood — 01/07/24 0730            Testing Performed By     Lab - Abbreviation Name Director Address Valid Date Range    139 - [facility] ([facility]) Goldberg, Cathryn A. MD [address] [place] [id number] 03/19/20 1441 - Present            Microbiology Results (All)     Procedure Component Value Units Date/Time    Urine Culture, Routine [[id number]]  (Abnormal)  (Susceptibility) Collected: 01/04/24 0217    Order Status: Completed Lab Status: Final result Updated: 01/07/24 0852    Specimen: Urine, clean catch      Urine Culture 50,000-99,000 CFU/ML Citrobacter braakii    Susceptibility      Citrobacter braakii      Not Specified     Cefazolin >=64  Resistant     Cefepime <=1  Sensitive     Ceftriaxone >=64  Resistant     Ciprofloxacin <=0.25  Sensitive     Gentamicin <=1  Sensitive     Levofloxacin <=0.12  Sensitive     Meropenem <=0.25  Sensitive     Nitrofurantoin <=16  Sensitive     Piperacillin + Tazobactam 8  Sensitive     Trimethoprim/Sulfa <=20  Sensitive                          Blood Culture [[id number]] Collected: 01/04/24 0153    Order Status: Completed Lab Status: Preliminary result Updated: 01/07/24 0401    Specimen: Blood,peripheral      Blood Culture Result No Growth 3 Days    Blood Culture [[id number]] Collected: 01/04/24 0153    Order Status:  Completed Lab Status: Preliminary result Updated: 24 0401    Specimen: Blood,peripheral      Blood Culture Result No Growth 3 Days    Clostridium difficile(toxigenic)PCR [[id number]]  (Normal) Collected: 24 0619    Order Status: Completed Lab Status: Final result Updated: 24 0743    Specimen: Stool      C. Difficile Toxin B Gene Negative    Rapid SARS-CoV-2 by PCR [[id number]]  (Normal) Collected: 24 0039    Order Status: Completed Lab Status: Final result Updated: 24 0104    Specimen: Other from Nares      Rapid SARS-CoV-2 by PCR Not Detected      Pending Labs     Order Current Status    Blood Culture Preliminary result    Blood Culture Preliminary result         H&P - H&P Note      H&P signed by Murphy Smith MD at 2024  2:57 AM  Version 1 of 1    Author: Murphy Smith MD Service: — Author Type: Physician    Filed: 2024  2:57 AM Date of Service: 2024  2:49 AM Status: Signed    : Murphy Smith MD (Physician)         [facility]     Germain Ernandez Patient Status:  Emergency    1935 MRN [id number]   Location [facility] EMERGENCY DEPARTMENT Attending Henry Mc MD   [facility]     Chief Complaint: chest pan     Subjective:    History of Present Illness:     Germain Ernandez is a 88 year old male with PMH CKD< Htn, HLD who p/w CP. Started at 9 PM while sitting in bed. Denies SOB, cough, radiation. Abd pain. The pain was severe and eventually started to vomit. Never had before.     History/Other:    Past Medical History:  Past Medical History:   Diagnosis Date    Chronic kidney disease     Essential hypertension     Hyperlipidemia      Past Surgical History:   History reviewed. No pertinent surgical history.   Family History:   No family history on file.  Social History:         Allergies: Not on File    Medications:    No current facility-administered medications on file prior to encounter.     No  current outpatient medications on file prior to encounter.       Review of Systems:   A comprehensive review of systems was completed.    Pertinent positives and negatives noted in the HPI.    Objective:   Physical Exam:    /88   Pulse 107   Temp 99.5 °F (37.5 °C) (Temporal)   Resp 15   Ht 6' 1\" (1.854 m)   Wt 236 lb (107 kg)   SpO2 97%   BMI 31.14 kg/m²   General: No acute distress, Alert  Respiratory: No rhonchi, no wheezes  Cardiovascular: S1, S2. Regular rate and rhythm, tachycardic  Abdomen: Soft, Non-tender, non-distended, positive bowel sounds  Neuro: No new focal deficits  Extremities: No edema      Results:    Labs:      Labs Last 24 Hours:    Recent Labs   Lab 01/03/24  2244   RBC 4.81   HGB 15.2   HCT 48.0   MCV 99.8   MCH 31.6   MCHC 31.7   RDW 14.8   NEPRELIM 12.85*   WBC 15.1*   .0       Recent Labs   Lab 01/03/24  2239   *   BUN 25*   CREATSERUM 1.69*   EGFRCR 39*   CA 8.7   ALB 3.4      K 3.8      CO2 29.0   ALKPHO 101   AST 19   ALT 18   BILT 0.4   TP 7.8       No results found for: \"PT\", \"INR\"    Recent Labs   Lab 01/03/24  2239   TROPHS 85*       No results for input(s): \"TROP\", \"PBNP\" in the last 168 hours.    No results for input(s): \"PCT\" in the last 168 hours.    Imaging: Imaging data reviewed in Epic.    Assessment & Plan:      #chest pain  #mildly elevated trop, likely demand ischemia  -cards  -trops  -asa    #SVT/sinus tachycardia  -s/p adenosine in ED, c/w sinus tach  -IVF  -likely related to infection as below    #n/v  -occurred after the pain started  -zofran    #low grade fever, leukocytosis  #suspected UTI  -iv abx  -cxs  -trend lactic  -IVF    #HTN  #HLD  #CKD      Plan of care discussed with pt, ed christiano Smith MD    Supplementary Documentation:     The 21st Century Cures Act makes medical notes like these available to patients in the interest of transparency. Please be advised this is a medical document. Medical documents are  intended to carry relevant information, facts as evident, and the clinical opinion of the practitioner. The medical note is intended as peer to peer communication and may appear blunt or direct. It is written in medical language and may contain abbreviations or verbiage that are unfamiliar.                                 Electronically signed by Murphy Smith MD on 2024  2:57 AM              Consults - MD Consult Notes      Consults signed by Jarod Bell DO at 2024  5:05 PM     Author: Jarod Bell DO Service: Gastroenterology Author Type: Physician    Filed: 2024  5:05 PM Date of Service: 2024  8:28 AM Status: Addendum    : Jarod Bell DO (Physician)    Related Notes: Original Note by Loren Kahn APRN (APN) filed at 2024  4:10 PM     Consult Orders    1. Consult to Gastroenterology [[id number]] ordered by Megan Tatum MD at 24             [facility]                       Gastroenterology Consultation-[facility] Gastroenterology    Germain Ernandez Patient Status:  Inpatient    1935 MRN [id number]   Location [facility] 2NE-A Attending Shani Nelson MD   Hosp Day # 1 PCP KENIA FLOR     Reason for consultation: Coffee ground emesis  HPI: Mr. Ernandez is a 89 y/o with a PMHx of GERD, PVD, HTN, hyperlipidemia, CKD, who presented to the ED on 1/3/23 with reports of chest pain; NSTEMi with troponin level noted 179. GI consulted for reports of coffee ground emesis. Patient is A/O x1-2; unable to provide pertinent health history. No family at bedside; clinical history provided by primary nurse who reports 3 episodes of coffee ground emesis, 2 episodes of black diarrheal stool and 1 episode of black diarrheal stool today. Per nurse patient is not currently taking Plavix. Per admission note patient has several episodes vomting at home prior to admission to the hospital.  He denies abdominal pain reporting constant nausea that won't go away.  Initial Hgb 15.2 downtrended to 13.2 BUN 34, Cr 1.7. CT notes some questionable wall thickening of the colon possible non-specific colitis.   PMHx:   Past Medical History:   Diagnosis Date    Chronic kidney disease     Esophageal reflux     Essential hypertension     High blood pressure     High cholesterol     Hyperlipidemia     Peripheral vascular disease (HCC)     Renal disorder                 PSHx: History reviewed. No pertinent surgical history.  Medications:    [COMPLETED] sodium chloride 0.9 % IV bolus 1,000 mL  1,000 mL Intravenous Once    [COMPLETED] adenosine (Adenocard) 6 mg/2mL injection 6 mg  6 mg Intravenous Once    [COMPLETED] adenosine (Adenocard) 6 mg/2mL injection 12 mg  12 mg Intravenous Once    aspirin tab 325 mg  325 mg Oral Daily    nitroglycerin (Nitrostat) SL tab 0.4 mg  0.4 mg Sublingual Q5 Min PRN    cefTRIAXone (Rocephin) 2 g in D5W 100 mL IVPB-ADD  2 g Intravenous Q24H    acetaminophen (Tylenol Extra Strength) tab 1,000 mg  1,000 mg Oral Q4H PRN    melatonin tab 3 mg  3 mg Oral Nightly PRN    polyethylene glycol (PEG 3350) (Miralax) 17 g oral packet 17 g  17 g Oral Daily PRN    sennosides (Senokot) tab 17.2 mg  17.2 mg Oral Nightly PRN    bisacodyl (Dulcolax) 10 MG rectal suppository 10 mg  10 mg Rectal Daily PRN    fleet enema (Fleet) 7-19 GM/118ML rectal enema 133 mL  1 enema Rectal Once PRN    ondansetron (Zofran) 4 MG/2ML injection 4 mg  4 mg Intravenous Q6H PRN    metoclopramide (Reglan) 5 mg/mL injection 5 mg  5 mg Intravenous Q8H PRN    benzonatate (Tessalon) cap 200 mg  200 mg Oral TID PRN    sodium chloride 0.9% infusion   Intravenous Continuous    enoxaparin (Lovenox) 40 MG/0.4ML SUBQ injection 40 mg  40 mg Subcutaneous Daily    [COMPLETED] piperacillin-tazobactam (Zosyn) 4.5 g in dextrose 5% 100 mL IVPB-ADDV  4.5 g Intravenous Once    [] sodium chloride 0.9 % IV bolus 3,210 mL  30 mL/kg Intravenous Continuous    calcium carbonate (Tums) chewable tab 1,000 mg  1,000 mg  Oral TID PRN    alum-mag hydroxide-simethicone (Maalox) 200-200-20 MG/5ML oral suspension 30 mL  30 mL Oral QID PRN    [COMPLETED] Perflutren Lipid Microsphere (DEFINITY) 6.52 MG/ML injection 1.5 mL  1.5 mL Intravenous ONCE PRN    vancomycin (Vancocin) cap 125 mg  125 mg Oral Daily    pantoprazole (Protonix) 40 mg in sodium chloride 0.9% PF 10 mL IV push  40 mg Intravenous Q12H    morphINE PF 4 MG/ML injection 4 mg  4 mg Intravenous Q30 Min PRN    [COMPLETED] ondansetron (Zofran) 4 MG/2ML injection 4 mg  4 mg Intravenous Once    [COMPLETED] iopamidol 76% (ISOVUE-370) injection for power injector  100 mL Intravenous ONCE PRN     Allergies: No Known Allergies  Social HX:   Social History     Socioeconomic History    Marital status:    Tobacco Use    Smoking status: Former     Types: Cigarettes, Cigars     Quit date:      Years since quittin.0    Smokeless tobacco: Never   Vaping Use    Vaping Use: Never used   Substance and Sexual Activity    Alcohol use: Yes     Comment: ocassional    Drug use: Never     Social Determinants of Health     Food Insecurity: No Food Insecurity (2024)    Food Insecurity     Food Insecurity: Never true   Transportation Needs: No Transportation Needs (2024)    Transportation Needs     Lack of Transportation: No   Housing Stability: Low Risk  (2024)    Housing Stability     Housing Instability: No      FamHx: The patient has no family history of colon cancer or other gastrointestinal malignancies;  No family history of ulcer disease, or inflammatory bowel disease  ROS:  In addition to the pertinent positives described above:            Infectious Disease:  No chronic infections or recent fevers prior to the acute illness            Cardiovascular: History of PVD, no CAD, prior MI, chest pain, or palpitations            Respiratory: No shortness of breath, asthma, copd, recurrent pneumonia            Hematologic: The patient reports no easy bruising, frequent gum  bleeding or nose bleeding;  The patient has no history of known chronic anemia            Dermatologic: The patient reports no recent rashes or chronic skin disorders            Rheumatologic: The patient reports no history of chronic arthritis, myalgias, arthralgias            Genitourinary:  The patient reports no history of recurrent urinary tract infections, hematuria, dysuria, or nephrolithiasis           Psychiatric: The patient reports no history of depression, anxiety, suicidal ideation, or homicidal ideation           Oncologic: The patient reports no history of prior solid tumor or hematologic malignancy           ENT: The patient reports no hoarseness of voice, hearing loss, sinus congestion, tinnitus           Neurologic: The patient reports no history of seizure, stroke, or frequent headaches  PE: /82 (BP Location: Right arm)   Pulse 82   Temp 97.8 °F (36.6 °C) (Oral)   Resp 16   Ht 6' 1\" (1.854 m)   Wt 237 lb 4.8 oz (107.6 kg)   SpO2 95%   BMI 31.31 kg/m²   Gen: AAO x 1-2, able to speak in complete sentences  HENT: EOMI, PERRL, oropharynx is clear with moist mucosal membranes  Eyes: Sclerae are anicteric  Neck:  Supple without nuchal rigidity  CV: Regular rate and rhythm, with normal S1 and S2  Resp: Clear to auscultation bilaterally without wheezes; rubs, rhonchi, or rales  Abdomen: Soft, non-tender, non-distended with the presence of bowel sounds; No hepatosplenomegaly; no rebound or guarding; No ascites is clinically apparent; no tympany to percussion  Ext: No peripheral edema or cyanosis  Skin: Warm and dry  Psychiatric: Appropriate mood and congruent affect without obvious depression or anxiety  Labs:   Lab Results   Component Value Date    WBC 9.5 01/04/2024    HGB 12.7 01/04/2024    HCT 39.7 01/04/2024    .0 01/04/2024     Recent Labs   Lab 01/03/24 2239   *   BUN 25*   CREATSERUM 1.69*   CA 8.7      K 3.8      CO2 29.0     Recent Labs   Lab 01/04/24 2144    RBC 4.01   HGB 12.7*   HCT 39.7   MCV 99.0   MCH 31.7   MCHC 32.0   RDW 15.0   NEPRELIM 7.06   WBC 9.5   .0       Recent Labs   Lab 01/03/24  2239   ALT 18   AST 19                   Imaging:   CT chest a/p:  Impression   CONCLUSION:  There is no evidence of thoracic aortic aneurysm or dissection on this nongated exam.     There is some liquid stool present within the colon with some questionable wall thickening.  The colon is limited assessment without oral contrast.  Incomplete distension also somewhat limits assessment.  A nonspecific colitis or enteritis may be  possible.     Impression: Mr. Ernandez is a 87 y/o with a PMHx of GERD, PVD, HTN, hyperlipidemia, CKD, who presented to the ED on 1/3/23 with reports of chest pain; NSTEMi with troponin level noted 179. GI consulted for reports of coffee ground emesis and melena stools; 3 episodes of coffee ground emesis, 2 episodes of black diarrheal stool and 1 episode of black diarrheal stool today. C-diff neg.  Initial Hgb 15.2 downtrended to 13.2; no overt bleeding. CT notes some questionable wall thickening of the colon possible non-specific colitis.     We will defer endoscopic evaluation at this time given recent NSTEMI. Based on history of GERD, previous reports of episodes of vomiting prior to hospital admission and now reports of coffee ground emesis and melena stools; source of bleeding likely r/t Nuvia Grant tear.     Recommendations:     Full liquids advance as tolerated.   PPI Daily  CBC/CMP in am; trend Hgb; transfuse pRBCs for Hgb <7  Will defer endoscopic evaluation at this time given recent NSTEMI and likely Nuvia Grant tear.     Attending addendum Dr. Bell to follow later today and provide formal, final recommendations at that time   FLAKITO Silva  8:29 AM  1/5/2024  [facility]  [phone]    Attending physician addendum:    I personally saw and examined the patient. I agree with the above comprehensive history,  exam, assessment and plan.     Mr. Germain Ernandez is an 88 year-old male being seen in consultation for coffee ground emesis. His abdomen is soft and non tender. He is non-toxic appearing and in no acute distress. Labs and imaging were reviewed. He was admitted with chest pain found to be NSTEMI per interventional cardiology 2/2 infection. He had several episodes of emesis at home non bloody which turned coffee ground. His hgb is stable. This is a Nuvia-Grant tear by clinical history and given stability and NSTEMI, endoscopy is not recommended. Recommend PPI daily x 8 weeks. Please call if patient develops signs of worsening bleeding. We will sign off, please call with any questions.    LETITIA Bell  Gastroenterology/Advanced Endoscopy  [facility]        Electronically signed by Jarod Bell DO on 1/5/2024  5:05 PM           D/C Summary    No notes of this type exist for this encounter.     Physical Therapy Notes (last 72 hours)  Notes from 1/4/2024  1:21 PM through 1/7/2024  1:21 PM   No notes of this type exist for this encounter.     Occupational Therapy Notes (last 72 hours)  Notes from 1/4/2024  1:21 PM through 1/7/2024  1:21 PM   No notes of this type exist for this encounter.     Video Swallow Study Notes    No notes of this type exist for this encounter.     SLP Notes    No notes of this type exist for this encounter.     Multidisciplinary Problems     Active Goals     Not on file          Resolved Goals        Problem: Patient/Family Goals    Goal Priority Disciplines Outcome Interventions   Patient/Family Long Term Goal   (Resolved)     Interdisciplinary Adequate for Discharge    Description: Patient's Long Term Goal: stay out of the hospital    Interventions:  - take all medications as prescribed  - attend all follow up appointments  - See additional Care Plan goals for specific interventions   Patient/Family Short Term Goal   (Resolved)     Interdisciplinary Adequate for Discharge     Description: Patient's Short Term Goal: go home    Interventions:   - take all medications as prescribed  - all testing as ordered by providers  - See additional Care Plan goals for specific interventions